# Patient Record
Sex: FEMALE | Race: OTHER | HISPANIC OR LATINO | URBAN - METROPOLITAN AREA
[De-identification: names, ages, dates, MRNs, and addresses within clinical notes are randomized per-mention and may not be internally consistent; named-entity substitution may affect disease eponyms.]

---

## 2024-07-25 ENCOUNTER — INPATIENT (INPATIENT)
Facility: HOSPITAL | Age: 29
LOS: 7 days | Discharge: HOME | DRG: 463 | End: 2024-08-02
Attending: HOSPITALIST | Admitting: INTERNAL MEDICINE
Payer: MEDICAID

## 2024-07-25 VITALS
DIASTOLIC BLOOD PRESSURE: 88 MMHG | TEMPERATURE: 98 F | RESPIRATION RATE: 17 BRPM | HEART RATE: 95 BPM | SYSTOLIC BLOOD PRESSURE: 118 MMHG | WEIGHT: 112.44 LBS | OXYGEN SATURATION: 99 %

## 2024-07-25 DIAGNOSIS — E87.1 HYPO-OSMOLALITY AND HYPONATREMIA: ICD-10-CM

## 2024-07-25 DIAGNOSIS — K59.00 CONSTIPATION, UNSPECIFIED: ICD-10-CM

## 2024-07-25 DIAGNOSIS — Z59.00 HOMELESSNESS UNSPECIFIED: ICD-10-CM

## 2024-07-25 DIAGNOSIS — B37.31 ACUTE CANDIDIASIS OF VULVA AND VAGINA: ICD-10-CM

## 2024-07-25 DIAGNOSIS — Z90.49 ACQUIRED ABSENCE OF OTHER SPECIFIED PARTS OF DIGESTIVE TRACT: Chronic | ICD-10-CM

## 2024-07-25 DIAGNOSIS — N20.0 CALCULUS OF KIDNEY: ICD-10-CM

## 2024-07-25 DIAGNOSIS — D75.839 THROMBOCYTOSIS, UNSPECIFIED: ICD-10-CM

## 2024-07-25 DIAGNOSIS — B78.9 STRONGYLOIDIASIS, UNSPECIFIED: ICD-10-CM

## 2024-07-25 DIAGNOSIS — N13.6 PYONEPHROSIS: ICD-10-CM

## 2024-07-25 DIAGNOSIS — D64.9 ANEMIA, UNSPECIFIED: ICD-10-CM

## 2024-07-25 DIAGNOSIS — Z90.49 ACQUIRED ABSENCE OF OTHER SPECIFIED PARTS OF DIGESTIVE TRACT: ICD-10-CM

## 2024-07-25 DIAGNOSIS — N39.0 URINARY TRACT INFECTION, SITE NOT SPECIFIED: ICD-10-CM

## 2024-07-25 LAB
ALBUMIN SERPL ELPH-MCNC: 4.1 G/DL — SIGNIFICANT CHANGE UP (ref 3.5–5.2)
ALP SERPL-CCNC: 98 U/L — SIGNIFICANT CHANGE UP (ref 30–115)
ALT FLD-CCNC: 10 U/L — SIGNIFICANT CHANGE UP (ref 0–41)
ANION GAP SERPL CALC-SCNC: 12 MMOL/L — SIGNIFICANT CHANGE UP (ref 7–14)
APPEARANCE UR: CLEAR — SIGNIFICANT CHANGE UP
APTT BLD: 34.4 SEC — SIGNIFICANT CHANGE UP (ref 27–39.2)
AST SERPL-CCNC: 18 U/L — SIGNIFICANT CHANGE UP (ref 0–41)
BASOPHILS # BLD AUTO: 0.07 K/UL — SIGNIFICANT CHANGE UP (ref 0–0.2)
BASOPHILS NFR BLD AUTO: 0.5 % — SIGNIFICANT CHANGE UP (ref 0–1)
BILIRUB SERPL-MCNC: 0.4 MG/DL — SIGNIFICANT CHANGE UP (ref 0.2–1.2)
BILIRUB UR-MCNC: NEGATIVE — SIGNIFICANT CHANGE UP
BUN SERPL-MCNC: 14 MG/DL — SIGNIFICANT CHANGE UP (ref 10–20)
CALCIUM SERPL-MCNC: 9.2 MG/DL — SIGNIFICANT CHANGE UP (ref 8.4–10.5)
CHLORIDE SERPL-SCNC: 98 MMOL/L — SIGNIFICANT CHANGE UP (ref 98–110)
CO2 SERPL-SCNC: 23 MMOL/L — SIGNIFICANT CHANGE UP (ref 17–32)
COLOR SPEC: YELLOW — SIGNIFICANT CHANGE UP
CREAT SERPL-MCNC: 0.8 MG/DL — SIGNIFICANT CHANGE UP (ref 0.7–1.5)
DIFF PNL FLD: NEGATIVE — SIGNIFICANT CHANGE UP
EGFR: 103 ML/MIN/1.73M2 — SIGNIFICANT CHANGE UP
EOSINOPHIL # BLD AUTO: 1.61 K/UL — HIGH (ref 0–0.7)
EOSINOPHIL NFR BLD AUTO: 12 % — HIGH (ref 0–8)
GLUCOSE SERPL-MCNC: 143 MG/DL — HIGH (ref 70–99)
GLUCOSE UR QL: NEGATIVE MG/DL — SIGNIFICANT CHANGE UP
HCG SERPL QL: NEGATIVE — SIGNIFICANT CHANGE UP
HCT VFR BLD CALC: 37.2 % — SIGNIFICANT CHANGE UP (ref 37–47)
HGB BLD-MCNC: 11.7 G/DL — LOW (ref 12–16)
IMM GRANULOCYTES NFR BLD AUTO: 0.4 % — HIGH (ref 0.1–0.3)
INR BLD: 1.18 RATIO — SIGNIFICANT CHANGE UP (ref 0.65–1.3)
KETONES UR-MCNC: NEGATIVE MG/DL — SIGNIFICANT CHANGE UP
LEUKOCYTE ESTERASE UR-ACNC: ABNORMAL
LIDOCAIN IGE QN: 49 U/L — SIGNIFICANT CHANGE UP (ref 7–60)
LYMPHOCYTES # BLD AUTO: 0.97 K/UL — LOW (ref 1.2–3.4)
LYMPHOCYTES # BLD AUTO: 7.2 % — LOW (ref 20.5–51.1)
MCHC RBC-ENTMCNC: 25.5 PG — LOW (ref 27–31)
MCHC RBC-ENTMCNC: 31.5 G/DL — LOW (ref 32–37)
MCV RBC AUTO: 81.2 FL — SIGNIFICANT CHANGE UP (ref 81–99)
MONOCYTES # BLD AUTO: 0.84 K/UL — HIGH (ref 0.1–0.6)
MONOCYTES NFR BLD AUTO: 6.3 % — SIGNIFICANT CHANGE UP (ref 1.7–9.3)
NEUTROPHILS # BLD AUTO: 9.88 K/UL — HIGH (ref 1.4–6.5)
NEUTROPHILS NFR BLD AUTO: 73.6 % — SIGNIFICANT CHANGE UP (ref 42.2–75.2)
NITRITE UR-MCNC: NEGATIVE — SIGNIFICANT CHANGE UP
NRBC # BLD: 0 /100 WBCS — SIGNIFICANT CHANGE UP (ref 0–0)
PH UR: 6 — SIGNIFICANT CHANGE UP (ref 5–8)
PLATELET # BLD AUTO: 437 K/UL — HIGH (ref 130–400)
PMV BLD: 9 FL — SIGNIFICANT CHANGE UP (ref 7.4–10.4)
POTASSIUM SERPL-MCNC: 4.3 MMOL/L — SIGNIFICANT CHANGE UP (ref 3.5–5)
POTASSIUM SERPL-SCNC: 4.3 MMOL/L — SIGNIFICANT CHANGE UP (ref 3.5–5)
PROT SERPL-MCNC: 9 G/DL — HIGH (ref 6–8)
PROT UR-MCNC: 30 MG/DL
PROTHROM AB SERPL-ACNC: 13.5 SEC — HIGH (ref 9.95–12.87)
RBC # BLD: 4.58 M/UL — SIGNIFICANT CHANGE UP (ref 4.2–5.4)
RBC # FLD: 15.7 % — HIGH (ref 11.5–14.5)
SODIUM SERPL-SCNC: 133 MMOL/L — LOW (ref 135–146)
SP GR SPEC: 1.02 — SIGNIFICANT CHANGE UP (ref 1–1.03)
UROBILINOGEN FLD QL: 0.2 MG/DL — SIGNIFICANT CHANGE UP (ref 0.2–1)
WBC # BLD: 13.43 K/UL — HIGH (ref 4.8–10.8)
WBC # FLD AUTO: 13.43 K/UL — HIGH (ref 4.8–10.8)

## 2024-07-25 PROCEDURE — 93005 ELECTROCARDIOGRAM TRACING: CPT

## 2024-07-25 PROCEDURE — 36415 COLL VENOUS BLD VENIPUNCTURE: CPT

## 2024-07-25 PROCEDURE — 99222 1ST HOSP IP/OBS MODERATE 55: CPT | Mod: GC

## 2024-07-25 PROCEDURE — 83735 ASSAY OF MAGNESIUM: CPT

## 2024-07-25 PROCEDURE — 80053 COMPREHEN METABOLIC PANEL: CPT

## 2024-07-25 PROCEDURE — 87116 MYCOBACTERIA CULTURE: CPT

## 2024-07-25 PROCEDURE — 93010 ELECTROCARDIOGRAM REPORT: CPT

## 2024-07-25 PROCEDURE — 87591 N.GONORRHOEAE DNA AMP PROB: CPT

## 2024-07-25 PROCEDURE — 87389 HIV-1 AG W/HIV-1&-2 AB AG IA: CPT

## 2024-07-25 PROCEDURE — 85027 COMPLETE CBC AUTOMATED: CPT

## 2024-07-25 PROCEDURE — 99285 EMERGENCY DEPT VISIT HI MDM: CPT

## 2024-07-25 PROCEDURE — 76775 US EXAM ABDO BACK WALL LIM: CPT

## 2024-07-25 PROCEDURE — 87015 SPECIMEN INFECT AGNT CONCNTJ: CPT

## 2024-07-25 PROCEDURE — 87798 DETECT AGENT NOS DNA AMP: CPT

## 2024-07-25 PROCEDURE — 74177 CT ABD & PELVIS W/CONTRAST: CPT | Mod: 26,MC

## 2024-07-25 PROCEDURE — 86901 BLOOD TYPING SEROLOGIC RH(D): CPT

## 2024-07-25 PROCEDURE — 76770 US EXAM ABDO BACK WALL COMP: CPT

## 2024-07-25 PROCEDURE — 87177 OVA AND PARASITES SMEARS: CPT

## 2024-07-25 PROCEDURE — 87206 SMEAR FLUORESCENT/ACID STAI: CPT

## 2024-07-25 PROCEDURE — 85730 THROMBOPLASTIN TIME PARTIAL: CPT

## 2024-07-25 PROCEDURE — 86850 RBC ANTIBODY SCREEN: CPT

## 2024-07-25 PROCEDURE — 87491 CHLMYD TRACH DNA AMP PROBE: CPT

## 2024-07-25 PROCEDURE — 71045 X-RAY EXAM CHEST 1 VIEW: CPT

## 2024-07-25 PROCEDURE — 87661 TRICHOMONAS VAGINALIS AMPLIF: CPT

## 2024-07-25 PROCEDURE — 76830 TRANSVAGINAL US NON-OB: CPT | Mod: 26

## 2024-07-25 PROCEDURE — 86900 BLOOD TYPING SEROLOGIC ABO: CPT

## 2024-07-25 PROCEDURE — 84100 ASSAY OF PHOSPHORUS: CPT

## 2024-07-25 PROCEDURE — 85610 PROTHROMBIN TIME: CPT

## 2024-07-25 PROCEDURE — 86682 HELMINTH ANTIBODY: CPT

## 2024-07-25 PROCEDURE — 85025 COMPLETE CBC W/AUTO DIFF WBC: CPT

## 2024-07-25 PROCEDURE — 87801 DETECT AGNT MULT DNA AMPLI: CPT

## 2024-07-25 PROCEDURE — 86480 TB TEST CELL IMMUN MEASURE: CPT

## 2024-07-25 RX ORDER — ONDANSETRON HCL/PF 4 MG/2 ML
4 VIAL (ML) INJECTION EVERY 8 HOURS
Refills: 0 | Status: DISCONTINUED | OUTPATIENT
Start: 2024-07-25 | End: 2024-08-02

## 2024-07-25 RX ORDER — KETOROLAC TROMETHAMINE 10 MG
15 TABLET ORAL ONCE
Refills: 0 | Status: DISCONTINUED | OUTPATIENT
Start: 2024-07-25 | End: 2024-07-25

## 2024-07-25 RX ORDER — PANTOPRAZOLE SODIUM 20 MG/1
40 TABLET, DELAYED RELEASE ORAL
Refills: 0 | Status: DISCONTINUED | OUTPATIENT
Start: 2024-07-25 | End: 2024-08-02

## 2024-07-25 RX ORDER — DEXTROSE MONOHYDRATE, SODIUM CHLORIDE, SODIUM LACTATE, CALCIUM CHLORIDE, MAGNESIUM CHLORIDE 1.5; 538; 448; 18.4; 5.08 G/100ML; MG/100ML; MG/100ML; MG/100ML; MG/100ML
1000 SOLUTION INTRAPERITONEAL ONCE
Refills: 0 | Status: COMPLETED | OUTPATIENT
Start: 2024-07-25 | End: 2024-07-25

## 2024-07-25 RX ORDER — MAGNESIUM, ALUMINUM HYDROXIDE 200-225/5
30 SUSPENSION, ORAL (FINAL DOSE FORM) ORAL EVERY 4 HOURS
Refills: 0 | Status: DISCONTINUED | OUTPATIENT
Start: 2024-07-25 | End: 2024-08-02

## 2024-07-25 RX ORDER — MELATONIN 3 MG
3 TABLET ORAL AT BEDTIME
Refills: 0 | Status: DISCONTINUED | OUTPATIENT
Start: 2024-07-25 | End: 2024-08-02

## 2024-07-25 RX ORDER — ACETAMINOPHEN 500 MG
650 TABLET ORAL EVERY 6 HOURS
Refills: 0 | Status: DISCONTINUED | OUTPATIENT
Start: 2024-07-25 | End: 2024-08-02

## 2024-07-25 RX ORDER — ONDANSETRON HCL/PF 4 MG/2 ML
4 VIAL (ML) INJECTION ONCE
Refills: 0 | Status: COMPLETED | OUTPATIENT
Start: 2024-07-25 | End: 2024-07-25

## 2024-07-25 RX ORDER — DEXTROSE MONOHYDRATE, SODIUM CHLORIDE, SODIUM LACTATE, CALCIUM CHLORIDE, MAGNESIUM CHLORIDE 1.5; 538; 448; 18.4; 5.08 G/100ML; MG/100ML; MG/100ML; MG/100ML; MG/100ML
500 SOLUTION INTRAPERITONEAL
Refills: 0 | Status: DISCONTINUED | OUTPATIENT
Start: 2024-07-25 | End: 2024-07-29

## 2024-07-25 RX ORDER — KETOROLAC TROMETHAMINE 10 MG
15 TABLET ORAL EVERY 8 HOURS
Refills: 0 | Status: DISCONTINUED | OUTPATIENT
Start: 2024-07-25 | End: 2024-07-26

## 2024-07-25 RX ADMIN — DEXTROSE MONOHYDRATE, SODIUM CHLORIDE, SODIUM LACTATE, CALCIUM CHLORIDE, MAGNESIUM CHLORIDE 1000 MILLILITER(S): 1.5; 538; 448; 18.4; 5.08 SOLUTION INTRAPERITONEAL at 17:30

## 2024-07-25 RX ADMIN — Medication 4 MILLIGRAM(S): at 15:00

## 2024-07-25 RX ADMIN — Medication 15 MILLIGRAM(S): at 23:27

## 2024-07-25 RX ADMIN — Medication 15 MILLIGRAM(S): at 15:00

## 2024-07-25 RX ADMIN — Medication 100 MILLIGRAM(S): at 15:59

## 2024-07-25 SDOH — ECONOMIC STABILITY - HOUSING INSECURITY: HOMELESSNESS UNSPECIFIED: Z59.00

## 2024-07-25 NOTE — H&P ADULT - ATTENDING COMMENTS
HPI:  28 yo F with PMH/PSH of appendectomy non-smoker, denies daily alcohol use, presents with right flank pain for the past 2 days, persistent, radiates to the right side of her back, associated w/ fever , chills and urinary hesitancy, denies measured fever, n/v/d, anorexia, cough, respiratory sx, change in bowel habits or other urinary sx, vaginal d/c or other associated complaints at present.     Patient reports she had a syncopal episode 1 month ago and was taken to a [facility] who had told her she had 2 Right kidney stone. She had other complaints, like nausea, weakness. Patient reports she was told to follow-up with urology; however, did not make an appointment yet. She was not prescribed Abx.     3yo had toxoplasmosis tx.    In ED,  -Vital Signs Last 24 Hrs  T(C): 36.8 (25 Jul 2024 16:02), Max: 36.9 (25 Jul 2024 13:43)  T(F): 98.2 (25 Jul 2024 16:02), Max: 98.4 (25 Jul 2024 13:43)  HR: 93 (25 Jul 2024 16:02) (93 - 95)  BP: 106/67 (25 Jul 2024 18:37) (99/61 - 118/88)  BP(mean): --  RR: 18 (25 Jul 2024 16:02) (17 - 18)  SpO2: 98% (25 Jul 2024 16:02) (98% - 99%)    Parameters below as of 25 Jul 2024 16:02  Patient On (Oxygen Delivery Method): room air    -Labs : wbc 13.43 , hg 11.7 , plt 437 ,na 133, k4.3, crea 0.8   -Ua positive WBC93, RBC0, occ bacteria, moderate LE, neg nitrite   -transvaginal US neg for torsion  - CT A/P shows Right delayed nephrogram, marked perinephric inflammatory stranding with moderate hydronephrosis, minimal distention of renal pelvis, with right UPJ 2.2 x 1.3 x 1.3 cm calculus. Additional right renal pelvis staghorn calculus measuring 3.1 x 1.6 x 1 cm.  Findings compatible with obstructing calculus with right severe pyelonephritis. No evidence of abscess. Some imaging features raise the possibility of a chronic process, however the typical characteristics of xanthogranulomatous pyelonephritis are not appreciated at this time. Additional nonobstructing calculi within the right kidney, measuring up to 0.7 cm.  -Zofran IV push and 2g Rocephin IVPB, 15mg of Toradol IV push, IVF   -Urology consulted REC IR   -Patient admitted for management      (25 Jul 2024 18:48)    REVIEW OF SYSTEMS: see cc/HPI   CONSTITUTIONAL: No weakness, fevers or chills  EYES/ENT: No visual changes;  No vertigo or throat pain   NECK: No pain or stiffness  RESPIRATORY: No cough, wheezing, hemoptysis; No shortness of breath  CARDIOVASCULAR: No chest pain or palpitations  GASTROINTESTINAL: No abdominal or epigastric pain. No nausea, vomiting, or hematemesis; No diarrhea or constipation. No melena or hematochezia.  GENITOURINARY: (+) dysuria, (+) frequency, NO hematuria, see cc/HPI   NEUROLOGICAL: No numbness or weakness  SKIN: No itching, rashes  ENDO: No hyperglycemia, No thyroid disorder, No dyslipidemia   HEM: No bleeding, No easy bruising, No anemia   PSYCHE: No psychosis, No mood disorder No hallucinations No delusion   MSK: No deformity, No fracture, No Joint swelling    Physical Exam:  General: WN/WD NAD  Neurology: A&Ox3, nonfocal, follows commands  Eyes: PERRLA/ EOMI  ENT/Neck: Neck supple, trachea midline, No JVD  Respiratory: CTA B/L, No wheezing, rales, rhonchi  CV: Normal rate regular rhythm, S1S2, no murmurs, rubs or gallops  Abdominal: Soft, NT, ND +BS,   Extremities: No edema, + peripheral pulses  Skin: No Rashes, Hematoma, Ecchymosis    A/p  Acute pyelonephritis   Obstructive uropathy w/ hydronephrosis 2/2 - Staghorn calculus - R renal pelvis and R UPJ calculus   -IV Abx   -IR for R PCNU - Urology recs noted   -Urology follow up     Anemia in young healthy female   -OP eval w/ PCP team    Ambulate for DVT prophylaxis     NOTE INCOMPLETE HPI:  30 yo F with PMH/PSH of appendectomy non-smoker, denies daily alcohol use, presents with right flank pain for the past 2 days, persistent, radiates to the right side of her back, associated w/ fever , chills and urinary hesitancy, denies measured fever, n/v/d, anorexia, cough, respiratory sx, change in bowel habits or other urinary sx, vaginal d/c or other associated complaints at present.     Patient reports she had a syncopal episode 1 month ago and was taken to a [facility] who had told her she had 2 Right kidney stone. She had other complaints, like nausea, weakness. Patient reports she was told to follow-up with urology; however, did not make an appointment yet. She was not prescribed Abx.     3yo had toxoplasmosis tx.    In ED,  -Vital Signs Last 24 Hrs  T(C): 36.8 (25 Jul 2024 16:02), Max: 36.9 (25 Jul 2024 13:43)  T(F): 98.2 (25 Jul 2024 16:02), Max: 98.4 (25 Jul 2024 13:43)  HR: 93 (25 Jul 2024 16:02) (93 - 95)  BP: 106/67 (25 Jul 2024 18:37) (99/61 - 118/88)  BP(mean): --  RR: 18 (25 Jul 2024 16:02) (17 - 18)  SpO2: 98% (25 Jul 2024 16:02) (98% - 99%)    Parameters below as of 25 Jul 2024 16:02  Patient On (Oxygen Delivery Method): room air    -Labs : wbc 13.43 , hg 11.7 , plt 437 ,na 133, k4.3, crea 0.8   -Ua positive WBC93, RBC0, occ bacteria, moderate LE, neg nitrite   -transvaginal US neg for torsion  - CT A/P shows Right delayed nephrogram, marked perinephric inflammatory stranding with moderate hydronephrosis, minimal distention of renal pelvis, with right UPJ 2.2 x 1.3 x 1.3 cm calculus. Additional right renal pelvis staghorn calculus measuring 3.1 x 1.6 x 1 cm.  Findings compatible with obstructing calculus with right severe pyelonephritis. No evidence of abscess. Some imaging features raise the possibility of a chronic process, however the typical characteristics of xanthogranulomatous pyelonephritis are not appreciated at this time. Additional nonobstructing calculi within the right kidney, measuring up to 0.7 cm.  -Zofran IV push and 2g Rocephin IVPB, 15mg of Toradol IV push, IVF   -Urology consulted REC IR   -Patient admitted for management      (25 Jul 2024 18:48)    REVIEW OF SYSTEMS: see cc/HPI   CONSTITUTIONAL: No weakness, fevers or chills  EYES/ENT: No visual changes;  No vertigo or throat pain   NECK: No pain or stiffness  RESPIRATORY: No cough, wheezing, hemoptysis; No shortness of breath  CARDIOVASCULAR: No chest pain or palpitations  GASTROINTESTINAL: (+) R flank /abdominal pain. No nausea, vomiting, or hematemesis; No diarrhea or constipation. No melena or hematochezia.  GENITOURINARY: (+) dysuria, (+) frequency, NO hematuria, see cc/HPI   NEUROLOGICAL: No numbness or weakness  SKIN: No itching, rashes  ENDO: No hyperglycemia, No thyroid disorder, No dyslipidemia   HEM: No bleeding, No easy bruising, No anemia   PSYCHE: No psychosis, No mood disorder No hallucinations No delusion   MSK: No deformity, No fracture, No Joint swelling    Physical Exam: Patient interview in Mongolian and all questions addressed  General: WN/WD NAD  Neurology: A&Ox3, nonfocal, follows commands  Eyes: PERRLA/ EOMI  ENT/Neck: Neck supple, trachea midline, No JVD  Respiratory: CTA B/L, No wheezing, rales, rhonchi  CV: Normal rate regular rhythm, S1S2, no murmurs, rubs or gallops  Abdominal: Soft, ND +BS, R flank tenderness and pain w/ percussion   Extremities: No edema, + peripheral pulses  Skin: No Rashes, Hematoma, Ecchymosis    A/p  Acute pyelonephritis   Obstructive uropathy w/ hydronephrosis 2/2 - Staghorn calculus - R renal pelvis and R UPJ calculus  -NPO after MN   -IV Abx   -IR for R PCNU - Urology recs noted   -Urology follow up     Anemia in young healthy female   -OP eval w/ PCP team    Ambulate for DVT prophylaxis

## 2024-07-25 NOTE — CONSULT NOTE ADULT - SUBJECTIVE AND OBJECTIVE BOX
Dr Gil Art ordered: Total Testosterone 843.5 range 250-1100 performed 6/2/2020. Discussed with patient believes last Testosterone test was done 4/2019 last with Dr Jeane Uribe. Please advise. UROLOGY CONSULT    Patient is a 28 yo F with PMH/PSH of appendectomy presents to ED with Right flank pain for 2 days-  c/s for CT finding of Right renal calculi with right pyelonephritis.     CT A/P shows Right delayed nephrogram, marked perinephric inflammatory stranding with moderate hydronephrosis, minimal distention of renal pelvis, with right UPJ 2.2 x 1.3 x 1.3 cm calculus. Additional right renal pelvis staghorn calculus measuring 3.1 x 1.6 x 1 cm.  Findings compatible with obstructing calculus with right severe pyelonephritis. No evidence of abscess. Some imaging features raise the possibility of a chronic process, however the typical characteristics of xanthogranulomatous pyelonephritis are not appreciated at this time. Additional nonobstructing calculi within the right kidney, measuring up to 0.7 cm.      PAST MEDICAL & SURGICAL HISTORY:      MEDICATIONS  (STANDING):  cefTRIAXone   IVPB 2000 milliGRAM(s) IV Intermittent once  ondansetron Injectable 4 milliGRAM(s) IV Push once    MEDICATIONS  (PRN):      Allergies    No Known Allergies    Intolerances        SOCIAL HISTORY: No illicit drug use    FAMILY HISTORY:      REVIEW OF SYSTEMS:  [ ] A ten-point review of systems was otherwise negative except as noted.   [ ] Due to altered mental status/intubation, subjective information were not able to be obtained from patient. History was obtained, to the extent possible, from review of the chart and collateral sources of information.    Vital Signs Last 24 Hrs  T(C): 36.8 (2024 16:02), Max: 36.9 (2024 13:43)  T(F): 98.2 (2024 16:02), Max: 98.4 (2024 13:43)  HR: 93 (2024 16:02) (93 - 95)  BP: 99/61 (2024 16:02) (99/61 - 118/88)  RR: 18 (2024 16:02) (17 - 18)  SpO2: 98% (2024 16:02) (98% - 99%)    Parameters below as of 2024 16:02  Patient On (Oxygen Delivery Method): room air        PHYSICAL EXAM:    GEN: NAD, well-developed, awake and alert.  SKIN: Good color, non diaphoretic.  HEENT: NC/AT.  RESP: No dyspnea, non-labored breathing. No use of accessory muscles.  CARDIO: +S1/S2  ABDO: Soft, NT/ND, no palpable bladder, no suprapubic tenderness  BACK: No CVAT B/L  : + Patient voids freely       I&O's Summary      LABS:                        11.7   13.43 )-----------( 437      ( 2024 14:57 )             37.2         133<L>  |  98  |  14  ----------------------------<  143<H>  4.3   |  23  |  0.8    Ca    9.2      2024 14:57    TPro  9.0<H>  /  Alb  4.1  /  TBili  0.4  /  DBili  x   /  AST  18  /  ALT  10  /  AlkPhos  98        Urinalysis Basic - ( 2024 14:57 )    Color: Yellow / Appearance: Clear / S.023 / pH: x  Gluc: 143 mg/dL / Ketone: Negative mg/dL  / Bili: Negative / Urobili: 0.2 mg/dL   Blood: x / Protein: 30 mg/dL / Nitrite: Negative   Leuk Esterase: Moderate / RBC: 0 /HPF / WBC 93 /HPF   Sq Epi: x / Non Sq Epi: 1 /HPF / Bacteria: Occasional /HPF            RADIOLOGY & ADDITIONAL STUDIES:  < from: CT Abdomen and Pelvis w/ IV Cont (24 @ 15:26) >  ACC: [id number] EXAM:  CT ABDOMEN AND PELVIS IC   ORDERED BY: Vladislav Rendon     PROCEDURE DATE:  2024          INTERPRETATION:  CLINICAL INFORMATION: Right Flank pain.    COMPARISON: None.    CONTRAST/COMPLICATIONS:  IV Contrast: Omnipaque [id number] cc administered   0 cc discarded  Oral Contrast: NONE  Complications: None reported at time of study completion    PROCEDURE:  CT of the Abdomen and Pelvis was performed.  Sagittal and coronal reformats were performed.    FINDINGS:  LOWER CHEST: Within normal limits.    LIVER: Within normal limits.  BILE DUCTS: Normal caliber.  GALLBLADDER: Within normal limits.  SPLEEN: Within normal limits.  PANCREAS: Within normal limits.  ADRENALS: Within normal limits.  KIDNEYS/URETERS:  Right delayed nephrogram, marked perinephric inflammatory stranding with   moderate hydronephrosis, minimal distention of renal pelvis, with right   UPJ 2.2 x 1.3 x 1.3 cm calculus (average Hounsfield units 1000).   Additional right renal pelvis staghorn calculus measuring 3.1 x 1.6 x 1   cm. Additional nonobstructing calculi within the right kidney, measuring   up to 0.7 cm.    Left kidney unremarkable.    BLADDER: Within normal limits.  REPRODUCTIVE ORGANS: Unremarkable at CT.    BOWEL: No bowel obstruction. Appendix not visualized, possibly resected.  PERITONEUM/RETROPERITONEUM: Likely reactive prominent retroperitoneal   lymph nodes.  VESSELS: Within normal limits.  LYMPH NODES: No lymphadenopathy.  ABDOMINAL WALL: Within normal limits.  BONES: Within normal limits.    IMPRESSION:    Right delayed nephrogram, marked perinephric inflammatory stranding with   moderate hydronephrosis, minimal distention of renal pelvis, with right   UPJ 2.2 x 1.3 x 1.3 cm calculus. Additional right renal pelvis staghorn   calculus measuring 3.1 x 1.6 x 1 cm.  Findings compatible with   obstructing calculus with right severe pyelonephritis. No evidence of   abscess. Some imaging features raise the possibility of a chronic   process, however the typical characteristicsof xanthogranulomatous   pyelonephritis are not appreciated at this time.    Additional nonobstructing calculi within the right kidney, measuring up   to 0.7 cm.      --- End of Report --    NA HUERTA MD; Resident Radiologist  This documenthas been electronically signed.  LISA BARKER MD; Attending Radiologist  This document has been electronically signed. 2024  4:19PM    < end of copied text >   UROLOGY CONSULT    Patient is a 28 yo F with PMH/PSH of appendectomy presents to ED with Right flank pain for 2 days-  c/s for CT finding of Right renal calculi with right pyelonephritis. Patient seen and examined at bedside,  services# [id number]. Patient seen and examined at bedside. Patient reports she had a syncopal episode 1 month ago and was taken to a [facility] who had told her she had 2 Right kidney stone. Patient reports she was told to follow-up with urology; however, did not make an appointment yet. Patient reports patient had intermittent sharp right flank pain, radiating to RLQ for 2 days, with associated nausea and chills. Denies any fever, SOB/difficulty breathing, dysuria, hematuria.    In ED, Zofran IV push and 2g Rocephin IVPB, 15mg of Toradol IV push, IVF patient reports pain is returning now. CT A/P shows Right delayed nephrogram, marked perinephric inflammatory stranding with moderate hydronephrosis, minimal distention of renal pelvis, with right UPJ 2.2 x 1.3 x 1.3 cm calculus. Additional right renal pelvis staghorn calculus measuring 3.1 x 1.6 x 1 cm.  Findings compatible with obstructing calculus with right severe pyelonephritis. No evidence of abscess. Some imaging features raise the possibility of a chronic process, however the typical characteristics of xanthogranulomatous pyelonephritis are not appreciated at this time. Additional nonobstructing calculi within the right kidney, measuring up to 0.7 cm.      PAST MEDICAL & SURGICAL HISTORY:      MEDICATIONS  (STANDING):  cefTRIAXone   IVPB 2000 milliGRAM(s) IV Intermittent once  ondansetron Injectable 4 milliGRAM(s) IV Push once    MEDICATIONS  (PRN):      Allergies    No Known Allergies    Intolerances        SOCIAL HISTORY: No illicit drug use    FAMILY HISTORY:      REVIEW OF SYSTEMS:  [ ] A ten-point review of systems was otherwise negative except as noted.   [ ] Due to altered mental status/intubation, subjective information were not able to be obtained from patient. History was obtained, to the extent possible, from review of the chart and collateral sources of information.    Vital Signs Last 24 Hrs  T(C): 36.8 (2024 16:02), Max: 36.9 (2024 13:43)  T(F): 98.2 (2024 16:02), Max: 98.4 (2024 13:43)  HR: 93 (2024 16:02) (93 - 95)  BP: 99/61 (2024 16:02) (99/61 - 118/88)  RR: 18 (2024 16:02) (17 - 18)  SpO2: 98% (2024 16:02) (98% - 99%)    Parameters below as of 2024 16:02  Patient On (Oxygen Delivery Method): room air        PHYSICAL EXAM:    GEN: NAD, well-developed, awake and alert.  SKIN: Good color, non diaphoretic.  HEENT: NC/AT.  RESP: No dyspnea, non-labored breathing. No use of accessory muscles.  CARDIO: +S1/S2  ABDO: Soft, NT/ND, no palpable bladder, no suprapubic tenderness  BACK: No CVAT B/L  : + Patient voids freely       I&O's Summary      LABS:                        11.7   13.43 )-----------( 437      ( 2024 14:57 )             37.2     07-25    133<L>  |  98  |  14  ----------------------------<  143<H>  4.3   |  23  |  0.8    Ca    9.2      2024 14:57    TPro  9.0<H>  /  Alb  4.1  /  TBili  0.4  /  DBili  x   /  AST  18  /  ALT  10  /  AlkPhos  98  07-25      Urinalysis Basic - ( 2024 14:57 )    Color: Yellow / Appearance: Clear / S.023 / pH: x  Gluc: 143 mg/dL / Ketone: Negative mg/dL  / Bili: Negative / Urobili: 0.2 mg/dL   Blood: x / Protein: 30 mg/dL / Nitrite: Negative   Leuk Esterase: Moderate / RBC: 0 /HPF / WBC 93 /HPF   Sq Epi: x / Non Sq Epi: 1 /HPF / Bacteria: Occasional /HPF            RADIOLOGY & ADDITIONAL STUDIES:  < from: CT Abdomen and Pelvis w/ IV Cont (24 @ 15:26) >  ACC: [id number] EXAM:  CT ABDOMEN AND PELVIS IC   ORDERED BY: Vladislav Rendon     PROCEDURE DATE:  2024          INTERPRETATION:  CLINICAL INFORMATION: Right Flank pain.    COMPARISON: None.    CONTRAST/COMPLICATIONS:  IV Contrast: Omnipaque [id number] cc administered   0 cc discarded  Oral Contrast: NONE  Complications: None reported at time of study completion    PROCEDURE:  CT of the Abdomen and Pelvis was performed.  Sagittal and coronal reformats were performed.    FINDINGS:  LOWER CHEST: Within normal limits.    LIVER: Within normal limits.  BILE DUCTS: Normal caliber.  GALLBLADDER: Within normal limits.  SPLEEN: Within normal limits.  PANCREAS: Within normal limits.  ADRENALS: Within normal limits.  KIDNEYS/URETERS:  Right delayed nephrogram, marked perinephric inflammatory stranding with   moderate hydronephrosis, minimal distention of renal pelvis, with right   UPJ 2.2 x 1.3 x 1.3 cm calculus (average Hounsfield units 1000).   Additional right renal pelvis staghorn calculus measuring 3.1 x 1.6 x 1   cm. Additional nonobstructing calculi within the right kidney, measuring   up to 0.7 cm.    Left kidney unremarkable.    BLADDER: Within normal limits.  REPRODUCTIVE ORGANS: Unremarkable at CT.    BOWEL: No bowel obstruction. Appendix not visualized, possibly resected.  PERITONEUM/RETROPERITONEUM: Likely reactive prominent retroperitoneal   lymph nodes.  VESSELS: Within normal limits.  LYMPH NODES: No lymphadenopathy.  ABDOMINAL WALL: Within normal limits.  BONES: Within normal limits.    IMPRESSION:    Right delayed nephrogram, marked perinephric inflammatory stranding with   moderate hydronephrosis, minimal distention of renal pelvis, with right   UPJ 2.2 x 1.3 x 1.3 cm calculus. Additional right renal pelvis staghorn   calculus measuring 3.1 x 1.6 x 1 cm.  Findings compatible with   obstructing calculus with right severe pyelonephritis. No evidence of   abscess. Some imaging features raise the possibility of a chronic   process, however the typical characteristicsof xanthogranulomatous   pyelonephritis are not appreciated at this time.    Additional nonobstructing calculi within the right kidney, measuring up   to 0.7 cm.      --- End of Report --    NA HUERTA MD; Resident Radiologist  This documenthas been electronically signed.  LISA BARKER MD; Attending Radiologist  This document has been electronically signed. 2024  4:19PM    < end of copied text >

## 2024-07-25 NOTE — ED ADULT TRIAGE NOTE - TEMPERATURE IN FAHRENHEIT (DEGREES F)
98.4 How Severe Are Your Spot(S)?: moderate What Is The Reason For Today's Visit?: Full Body Skin Examination What Is The Reason For Today's Visit? (Being Monitored For X): the risk of recurrence of previously treated lesion(s)

## 2024-07-25 NOTE — H&P ADULT - NSHPLABSRESULTS_GEN_ALL_CORE
LABS:                        11.7   13.43 )-----------( 437      ( 25 Jul 2024 14:57 )             37.2     07-25    133<L>  |  98  |  14  ----------------------------<  143<H>  4.3   |  23  |  0.8    Ca    9.2      25 Jul 2024 14:57    TPro  9.0<H>  /  Alb  4.1  /  TBili  0.4  /  DBili  x   /  AST  18  /  ALT  10  /  AlkPhos  98  07-25        Urinalysis with Rflx Culture (collected 25 Jul 2024 14:57)     US Transvaginal (07.25.24 @ 16:48) >    FINDINGS:  Uterus: 5.1 x 2.2 x 3.1 cm. Within normal limits. Nabothian cysts.  Endometrium: 5 mm. Within normal limits.    Right ovary: 2.9 x 2.1 x 2.4 cm. Volume is 8 cc. Within normal limits.   Doppler flow is demonstrated.  Left ovary: 3.2 x 2.0 x 1.8. Volume is 6 cc. Within normal limits.   Doppler flow is demonstrated.    Fluid: None.    IMPRESSION:  Normal pelvic sonogram.    < end of copied text >      < from: CT Abdomen and Pelvis w/ IV Cont (07.25.24 @ 15:26) >    IMPRESSION:    Right delayed nephrogram, marked perinephric inflammatory stranding with   moderate hydronephrosis, minimal distention of renal pelvis, with right   UPJ 2.2 x 1.3 x 1.3 cm calculus. Additional right renal pelvis staghorn   calculus measuring 3.1 x 1.6 x 1 cm.  Findings compatible with   obstructing calculus with right severe pyelonephritis. No evidence of   abscess. Some imaging features raise the possibility of a chronic   process, however the typical characteristicsof xanthogranulomatous   pyelonephritis are not appreciated at this time.    Additional nonobstructing calculi within the right kidney, measuring up   to 0.7 cm.      < end of copied text >

## 2024-07-25 NOTE — ED PROVIDER NOTE - OBJECTIVE STATEMENT
29-year-old female, Turkish-speaking history corroborated using language line Nelson [id number] presents with right lower abdominal pain radiating to right lower back and flank X 2 days.  Patient reports associated subjective fevers and nausea, no vomiting.  Patient states in the past she was worked up for similar symptoms and told she has kidney stones.  Patient reports urinary hesitancy X 1 week.  Denies chest pain, shortness of breath, diarrhea.

## 2024-07-25 NOTE — ED PROVIDER NOTE - CLINICAL SUMMARY MEDICAL DECISION MAKING FREE TEXT BOX
VSS, febrile, CT with obstructing renal stone and pyelo, pt appears comfortable, pain controlled, unlikely to pass with expectant management,  and IR consulted. Plan to admit for further evaluation and management.    Abdominal exam without peritoneal signs. No evidence of acute abdomen currently. Well appearing. Given work up, low suspicion for acute hepatobiliary disease (including acute cholecystitis or cholangitis), acute pancreatitis, PUD (including gastric perforation), acute infectious processes (including pneumonia, hepatitis, pyelonephritis), acute appendicitis, vascular catastrophe, bowel obstruction, viscus perforation, torsion, diverticulitis, or acute coronary syndrome. Presentation not consistent with other acute, emergent causes of abdominal pain at this time.

## 2024-07-25 NOTE — ED ADULT NURSE NOTE - NSFALLUNIVINTERV_ED_ALL_ED
Bed/Stretcher in lowest position, wheels locked, appropriate side rails in place/Call bell, personal items and telephone in reach/Instruct patient to call for assistance before getting out of bed/chair/stretcher/Non-slip footwear applied when patient is off stretcher/[place] to call system/Physically safe environment - no spills, clutter or unnecessary equipment/Purposeful proactive rounding/Room/bathroom lighting operational, light cord in reach

## 2024-07-25 NOTE — ED PROVIDER NOTE - CARE PLAN
1 Principal Discharge DX:	Pyelonephritis  Secondary Diagnosis:	Staghorn calculus  Secondary Diagnosis:	UTI (urinary tract infection)

## 2024-07-25 NOTE — H&P ADULT - ASSESSMENT
30 yo F with PMH/PSH of appendectomy non-smoker, denies daily alcohol use, presents with right flank pain for the past 2 days.     #right flank pain  #pyelonephritis  #kidney stones  -Labs : wbc 13.43 , hg 11.7 , plt 437 ,na 133, k4.3, crea 0.8   -Ua positive WBC93, RBC0, occ bacteria, moderate LE, neg nitrite   - CT A/P shows Right delayed nephrogram, marked perinephric inflammatory stranding with moderate hydronephrosis, minimal distention of renal pelvis, with right UPJ 2.2 x 1.3 x 1.3 cm calculus. Additional right renal pelvis staghorn calculus measuring 3.1 x 1.6 x 1 cm.  Findings compatible with obstructing calculus with right severe pyelonephritis. No evidence of abscess. Some imaging features raise the possibility of a chronic process, however the typical characteristics of xanthogranulomatous pyelonephritis are not appreciated at this time. Additional nonobstructing calculi within the right kidney, measuring up to 0.7 cm.  -Zofran IV push and 2g Rocephin IVPB, 15mg of Toradol IV push, IVF   - seen by uro in ED  - Due to location and size of her to stone, recommend right PCN/PCNU placement by IR at this time   - IR c/s for Right PCN/PCNU placement   - Continue IV abx - Rocephin   - ID c/s for abx recommendation   - Continue IVF  - Analgesics for pain control prn - i.e Toradol   - f/u UCx   - Patient will need to f/u outpatient with urology for definitive stone management once infection is resolved.     #anemia  -OP f/u     #MISC  -DVT ppx:   -GI ppx:   -DIET:   -Activity: AAT  -Code Status: Full   -Pending:   -DIspo:      30 yo F with PMH/PSH of appendectomy non-smoker, denies daily alcohol use, presents with right flank pain for the past 2 days.     #right flank pain  #pyelonephritis  #kidney stones  -Labs : wbc 13.43 , hg 11.7 , plt 437 ,na 133, k4.3, crea 0.8   -Ua positive WBC93, RBC0, occ bacteria, moderate LE, neg nitrite   - CT A/P shows Right delayed nephrogram, marked perinephric inflammatory stranding with moderate hydronephrosis, minimal distention of renal pelvis, with right UPJ 2.2 x 1.3 x 1.3 cm calculus. Additional right renal pelvis staghorn calculus measuring 3.1 x 1.6 x 1 cm.  Findings compatible with obstructing calculus with right severe pyelonephritis. No evidence of abscess. Some imaging features raise the possibility of a chronic process, however the typical characteristics of xanthogranulomatous pyelonephritis are not appreciated at this time. Additional nonobstructing calculi within the right kidney, measuring up to 0.7 cm.  -Zofran IV push and 2g Rocephin IVPB, 15mg of Toradol IV push, IVF   - seen by uro in ED  - Due to location and size of her to stone, recommend right PCN/PCNU placement by IR at this time   - IR c/s for Right PCN/PCNU placement , npo after midnight, type and screen  and inr 8pm and in am   - Continue IV abx - Rocephin   - ID cs  - Continue IVF, LR 100ml per hour for 12 hours  - Analgesics for pain control prn toradol and tylenol  - f/u UCx   - Patient will need to f/u outpatient with urology for definitive stone management once infection is resolved.   -if fever and chills take blood cultures     #anemia  -mcv 81   -OP f/u     #MISC  -DVT ppx: off for procedure tomorrow, restart afterwards  -GI ppx: ppi in setting of nsaid use   -DIET: regular , npo after midnight   -Activity: AAT  -Code Status: Full      30 yo F with PMH/PSH of appendectomy non-smoker, denies daily alcohol use, presents with right flank pain for the past 2 days.     #right flank pain  #pyelonephritis  #kidney stones  #leukocytosis  -Labs : wbc 13.43 , hg 11.7 , plt 437 ,na 133, k4.3, crea 0.8   -Ua positive WBC93, RBC0, occ bacteria, moderate LE, neg nitrite   - CT A/P shows Right delayed nephrogram, marked perinephric inflammatory stranding with moderate hydronephrosis, minimal distention of renal pelvis, with right UPJ 2.2 x 1.3 x 1.3 cm calculus. Additional right renal pelvis staghorn calculus measuring 3.1 x 1.6 x 1 cm.  Findings compatible with obstructing calculus with right severe pyelonephritis. No evidence of abscess. Some imaging features raise the possibility of a chronic process, however the typical characteristics of xanthogranulomatous pyelonephritis are not appreciated at this time. Additional nonobstructing calculi within the right kidney, measuring up to 0.7 cm.  -Zofran IV push and 2g Rocephin IVPB, 15mg of Toradol IV push, IVF   - seen by uro in ED  - Due to location and size of her to stone, recommend right PCN/PCNU placement by IR at this time   - IR c/s for Right PCN/PCNU placement , npo after midnight, type and screen  and inr 8pm and in am   - Continue IV abx - Rocephin   - ID cs  - Continue IVF, LR 100ml per hour for 12 hours  - Analgesics for pain control prn toradol and tylenol  - f/u UCx   - Patient will need to f/u outpatient with urology for definitive stone management once infection is resolved.   -if fever and chills take blood cultures     #anemia  -mcv 81   -OP f/u     #MISC  -DVT ppx: off for procedure tomorrow, restart afterwards  -GI ppx: ppi in setting of nsaid use   -DIET: regular , npo after midnight   -Activity: AAT  -Code Status: Full

## 2024-07-25 NOTE — ED PROVIDER NOTE - PHYSICAL EXAMINATION
Vital Signs: I have reviewed the initial vital signs.  CONSTITUTIONAL: Pt in no acute distress laying on stretcher, nontoxic.  SKIN: Skin exam is warm and dry, no acute rash.  HEAD: Normocephalic; atraumatic.  EYES: PERRL, EOM intact; conjunctiva and sclera clear.  ENT: No nasal discharge; airway clear.   NECK: Supple; FROM  CARD: S1, S2 normal; no murmurs, gallops, or rubs. Regular rate and rhythm.  RESP: CTAB. No wheezes, rales or rhonchi.  ABD: +right sided tenderness, soft; non-distended; no hepatosplenomegaly.  MSK: +right CVA tenderness. Normal ROM. No clubbing, cyanosis or edema.

## 2024-07-25 NOTE — H&P ADULT - HISTORY OF PRESENT ILLNESS
30 yo F with PMH/PSH of appendectomy non-smoker, denies daily alcohol use, presents with right flank pain for the past 2 days, persistent, radiates to the right side of her back, associated tactile temp, chills and urinary hesitancy, denies measured fever, n/v/d, anorexia, cough, respiratory sx, change in bowel habits or other urinary sx, vaginal d/c or other associated complaints at present.     Patient reports she had a syncopal episode 1 month ago and was taken to a [facility] who had told her she had 2 Right kidney stone. Patient reports she was told to follow-up with urology; however, did not make an appointment yet.     In ED,  -Vital Signs Last 24 Hrs  T(C): 36.8 (25 Jul 2024 16:02), Max: 36.9 (25 Jul 2024 13:43)  T(F): 98.2 (25 Jul 2024 16:02), Max: 98.4 (25 Jul 2024 13:43)  HR: 93 (25 Jul 2024 16:02) (93 - 95)  BP: 106/67 (25 Jul 2024 18:37) (99/61 - 118/88)  BP(mean): --  RR: 18 (25 Jul 2024 16:02) (17 - 18)  SpO2: 98% (25 Jul 2024 16:02) (98% - 99%)    Parameters below as of 25 Jul 2024 16:02  Patient On (Oxygen Delivery Method): room air    -Labs : wbc 13.43 , hg 11.7 , plt 437 ,na 133, k4.3, crea 0.8   -Ua positive WBC93, RBC0, occ bacteria, moderate LE, neg nitrite   -transvaginal US neg for torsion  - CT A/P shows Right delayed nephrogram, marked perinephric inflammatory stranding with moderate hydronephrosis, minimal distention of renal pelvis, with right UPJ 2.2 x 1.3 x 1.3 cm calculus. Additional right renal pelvis staghorn calculus measuring 3.1 x 1.6 x 1 cm.  Findings compatible with obstructing calculus with right severe pyelonephritis. No evidence of abscess. Some imaging features raise the possibility of a chronic process, however the typical characteristics of xanthogranulomatous pyelonephritis are not appreciated at this time. Additional nonobstructing calculi within the right kidney, measuring up to 0.7 cm.  -Zofran IV push and 2g Rocephin IVPB, 15mg of Toradol IV push, IVF   -Urology consulted      28 yo F with PMH/PSH of appendectomy non-smoker, denies daily alcohol use, presents with right flank pain for the past 2 days, persistent, radiates to the right side of her back, associated tactile temp, chills and urinary hesitancy, denies measured fever, n/v/d, anorexia, cough, respiratory sx, change in bowel habits or other urinary sx, vaginal d/c or other associated complaints at present.     Patient reports she had a syncopal episode 1 month ago and was taken to a [facility] who had told her she had 2 Right kidney stone. Patient reports she was told to follow-up with urology; however, did not make an appointment yet.     In ED,  -Vital Signs Last 24 Hrs  T(C): 36.8 (25 Jul 2024 16:02), Max: 36.9 (25 Jul 2024 13:43)  T(F): 98.2 (25 Jul 2024 16:02), Max: 98.4 (25 Jul 2024 13:43)  HR: 93 (25 Jul 2024 16:02) (93 - 95)  BP: 106/67 (25 Jul 2024 18:37) (99/61 - 118/88)  BP(mean): --  RR: 18 (25 Jul 2024 16:02) (17 - 18)  SpO2: 98% (25 Jul 2024 16:02) (98% - 99%)    Parameters below as of 25 Jul 2024 16:02  Patient On (Oxygen Delivery Method): room air    -Labs : wbc 13.43 , hg 11.7 , plt 437 ,na 133, k4.3, crea 0.8   -Ua positive WBC93, RBC0, occ bacteria, moderate LE, neg nitrite   -transvaginal US neg for torsion  - CT A/P shows Right delayed nephrogram, marked perinephric inflammatory stranding with moderate hydronephrosis, minimal distention of renal pelvis, with right UPJ 2.2 x 1.3 x 1.3 cm calculus. Additional right renal pelvis staghorn calculus measuring 3.1 x 1.6 x 1 cm.  Findings compatible with obstructing calculus with right severe pyelonephritis. No evidence of abscess. Some imaging features raise the possibility of a chronic process, however the typical characteristics of xanthogranulomatous pyelonephritis are not appreciated at this time. Additional nonobstructing calculi within the right kidney, measuring up to 0.7 cm.  -Zofran IV push and 2g Rocephin IVPB, 15mg of Toradol IV push, IVF   -Urology consulted REC IR   -Patient admitted for management      28 yo F with PMH/PSH of appendectomy non-smoker, denies daily alcohol use, presents with right flank pain for the past 2 days, persistent, radiates to the right side of her back, associated w/ fever , chills and urinary hesitancy, denies measured fever, n/v/d, anorexia, cough, respiratory sx, change in bowel habits or other urinary sx, vaginal d/c or other associated complaints at present.     Patient reports she had a syncopal episode 1 month ago and was taken to a [facility] who had told her she had 2 Right kidney stone.She had other complaints, like nausea, weakness. Patient reports she was told to follow-up with urology; however, did not make an appointment yet. She was not prescribed abx.     3yo had toxoplasmosis tx.    In ED,  -Vital Signs Last 24 Hrs  T(C): 36.8 (25 Jul 2024 16:02), Max: 36.9 (25 Jul 2024 13:43)  T(F): 98.2 (25 Jul 2024 16:02), Max: 98.4 (25 Jul 2024 13:43)  HR: 93 (25 Jul 2024 16:02) (93 - 95)  BP: 106/67 (25 Jul 2024 18:37) (99/61 - 118/88)  BP(mean): --  RR: 18 (25 Jul 2024 16:02) (17 - 18)  SpO2: 98% (25 Jul 2024 16:02) (98% - 99%)    Parameters below as of 25 Jul 2024 16:02  Patient On (Oxygen Delivery Method): room air    -Labs : wbc 13.43 , hg 11.7 , plt 437 ,na 133, k4.3, crea 0.8   -Ua positive WBC93, RBC0, occ bacteria, moderate LE, neg nitrite   -transvaginal US neg for torsion  - CT A/P shows Right delayed nephrogram, marked perinephric inflammatory stranding with moderate hydronephrosis, minimal distention of renal pelvis, with right UPJ 2.2 x 1.3 x 1.3 cm calculus. Additional right renal pelvis staghorn calculus measuring 3.1 x 1.6 x 1 cm.  Findings compatible with obstructing calculus with right severe pyelonephritis. No evidence of abscess. Some imaging features raise the possibility of a chronic process, however the typical characteristics of xanthogranulomatous pyelonephritis are not appreciated at this time. Additional nonobstructing calculi within the right kidney, measuring up to 0.7 cm.  -Zofran IV push and 2g Rocephin IVPB, 15mg of Toradol IV push, IVF   -Urology consulted REC IR   -Patient admitted for management

## 2024-07-25 NOTE — ED PROVIDER NOTE - ATTENDING APP SHARED VISIT CONTRIBUTION OF CARE
I have reviewed and agree with the mid-level note, except as documented in my note below.    29-year-old female denies significant PMH, PSH significant for appendectomy,, non-smoker, denies daily alcohol use, now presents with right flank pain for the past 2 days, persistent, radiates to the right side of her back, associated tactile temp, chills and urinary hesitancy, denies measured fever, n/v/d, anorexia, cough, respiratory sx, change in bowel habits or other urinary sx, vaginal d/c or other associated complaints at present. No old chart available for review. I have reviewed and agree with the initial nursing note, except as documented in my note.    VSS, awake, alert, non-toxic appearing, no scleral icterus, oropharynx clear, mmm, no jaundice, skin rash or lesions, chest CTAB, non-labored breathing, no w/r/r, +S1/S2, RRR, no m/r/g, abdomen soft, RLQ>RUQ ttp w/o peritoneal signs, +BS, no hernias or distention, no pulsatile masses or bruits appreciated, no CVA tenderness, no peripheral edema or deformities, alert, clear speech and steady gait.

## 2024-07-25 NOTE — H&P ADULT - NSHPPHYSICALEXAM_GEN_ALL_CORE
CONSTITUTIONAL: Pt in no acute distress , nontoxic.  SKIN: Skin exam is warm and dry, no acute rash.  HEAD: Normocephalic; atraumatic.  EYES: PERRL, EOM intact; conjunctiva and sclera clear.  ENT: No nasal discharge; airway clear.   NECK: Supple; FROM  CARD: S1, S2 normal; no murmurs, gallops, or rubs. Regular rate and rhythm.  RESP: CTAB. No wheezes, rales or rhonchi.  ABD: +right sided tenderness, soft; non-distended; no hepatosplenomegaly.  MSK: +right CVA tenderness. Normal ROM. No clubbing, cyanosis or edema.

## 2024-07-25 NOTE — CONSULT NOTE ADULT - ASSESSMENT
Patient is a 28 yo F with PMH/PSH of appendectomy presents to ED with Right flank pain for 2 days-  c/s for CT finding of Right renal calculi with right pyelonephritis. CT A/P shows Right delayed nephrogram, marked perinephric inflammatory stranding with moderate hydronephrosis, minimal distention of renal pelvis, with right UPJ 2.2 x 1.3 x 1.3 cm calculus. Additional right renal pelvis staghorn calculus measuring 3.1 x 1.6 x 1 cm.  Findings compatible with obstructing calculus with right severe pyelonephritis. No evidence of abscess. Some imaging features raise the possibility of a chronic process, however the typical characteristics of xanthogranulomatous pyelonephritis are not appreciated at this time. Additional nonobstructing calculi within the right kidney, measuring up to 0.7 cm. Patient is a 30 yo F with PMH/PSH of appendectomy presents to ED with Right flank pain for 2 days-  c/s for CT finding of Right renal calculi with right pyelonephritis. CT A/P shows Right delayed nephrogram, marked perinephric inflammatory stranding with moderate hydronephrosis, minimal distention of renal pelvis, with right UPJ 2.2 x 1.3 x 1.3 cm calculus. Additional right renal pelvis staghorn calculus measuring 3.1 x 1.6 x 1 cm.  Findings compatible with obstructing calculus with right severe pyelonephritis. No evidence of abscess. Some imaging features raise the possibility of a chronic process, however the typical characteristics of xanthogranulomatous pyelonephritis are not appreciated at this time. Additional nonobstructing calculi within the right kidney, measuring up to 0.7 cm.    Plan:   Case discussed and CT reviewed with Dr. Carter, plan as follows:   - Due to location and size of her to stone, recommend right PCN placement by IR at this time   - IR c/s for Right PCN placement   - Continue IV abx - Rocephin   - ID c/s for abx recommendation   - Continue IVF  - Analgesics for pain control prn - i.e Toradol   - f/u UCx   - Patient will need to f/u outpatient with urology for definitive stone management once infection is resolved.   - Will follow   - spoke with ED team Patient is a 28 yo F with PMH/PSH of appendectomy presents to ED with Right flank pain for 2 days-  c/s for CT finding of Right renal calculi with right pyelonephritis. CT A/P shows Right delayed nephrogram, marked perinephric inflammatory stranding with moderate hydronephrosis, minimal distention of renal pelvis, with right UPJ 2.2 x 1.3 x 1.3 cm calculus. Additional right renal pelvis staghorn calculus measuring 3.1 x 1.6 x 1 cm.  Findings compatible with obstructing calculus with right severe pyelonephritis. No evidence of abscess. Some imaging features raise the possibility of a chronic process, however the typical characteristics of xanthogranulomatous pyelonephritis are not appreciated at this time. Additional nonobstructing calculi within the right kidney, measuring up to 0.7 cm.    Plan:   Case discussed and CT reviewed with Dr. Carter, plan as follows:   - Due to location and size of her to stone, recommend right PCN/PCNU placement by IR at this time   - IR c/s for Right PCN/PCNU placement   - Continue IV abx - Rocephin   - ID c/s for abx recommendation   - Continue IVF  - Analgesics for pain control prn - i.e Toradol   - f/u UCx   - Patient will need to f/u outpatient with urology for definitive stone management once infection is resolved.   - Will follow   - spoke with ED team

## 2024-07-25 NOTE — ED PROVIDER NOTE - PROGRESS NOTE DETAILS
CA: urology consulted after CT callback showed 2cm calculus in setting of UTI/pyelonephritis. Urology aware of pt.

## 2024-07-26 LAB
ALBUMIN SERPL ELPH-MCNC: 3.8 G/DL — SIGNIFICANT CHANGE UP (ref 3.5–5.2)
ALP SERPL-CCNC: 78 U/L — SIGNIFICANT CHANGE UP (ref 30–115)
ALT FLD-CCNC: 9 U/L — SIGNIFICANT CHANGE UP (ref 0–41)
ANION GAP SERPL CALC-SCNC: 11 MMOL/L — SIGNIFICANT CHANGE UP (ref 7–14)
APTT BLD: 35.4 SEC — SIGNIFICANT CHANGE UP (ref 27–39.2)
AST SERPL-CCNC: 14 U/L — SIGNIFICANT CHANGE UP (ref 0–41)
BILIRUB SERPL-MCNC: 0.3 MG/DL — SIGNIFICANT CHANGE UP (ref 0.2–1.2)
BUN SERPL-MCNC: 14 MG/DL — SIGNIFICANT CHANGE UP (ref 10–20)
CALCIUM SERPL-MCNC: 8.7 MG/DL — SIGNIFICANT CHANGE UP (ref 8.4–10.5)
CHLORIDE SERPL-SCNC: 100 MMOL/L — SIGNIFICANT CHANGE UP (ref 98–110)
CO2 SERPL-SCNC: 24 MMOL/L — SIGNIFICANT CHANGE UP (ref 17–32)
CREAT SERPL-MCNC: 1 MG/DL — SIGNIFICANT CHANGE UP (ref 0.7–1.5)
EGFR: 79 ML/MIN/1.73M2 — SIGNIFICANT CHANGE UP
GLUCOSE SERPL-MCNC: 73 MG/DL — SIGNIFICANT CHANGE UP (ref 70–99)
HCT VFR BLD CALC: 34 % — LOW (ref 37–47)
HGB BLD-MCNC: 10.6 G/DL — LOW (ref 12–16)
INR BLD: 1.18 RATIO — SIGNIFICANT CHANGE UP (ref 0.65–1.3)
MCHC RBC-ENTMCNC: 25.5 PG — LOW (ref 27–31)
MCHC RBC-ENTMCNC: 31.2 G/DL — LOW (ref 32–37)
MCV RBC AUTO: 81.7 FL — SIGNIFICANT CHANGE UP (ref 81–99)
NRBC # BLD: 0 /100 WBCS — SIGNIFICANT CHANGE UP (ref 0–0)
PLATELET # BLD AUTO: 387 K/UL — SIGNIFICANT CHANGE UP (ref 130–400)
PMV BLD: 8.8 FL — SIGNIFICANT CHANGE UP (ref 7.4–10.4)
POTASSIUM SERPL-MCNC: 4.6 MMOL/L — SIGNIFICANT CHANGE UP (ref 3.5–5)
POTASSIUM SERPL-SCNC: 4.6 MMOL/L — SIGNIFICANT CHANGE UP (ref 3.5–5)
PROT SERPL-MCNC: 7.9 G/DL — SIGNIFICANT CHANGE UP (ref 6–8)
PROTHROM AB SERPL-ACNC: 13.5 SEC — HIGH (ref 9.95–12.87)
RBC # BLD: 4.16 M/UL — LOW (ref 4.2–5.4)
RBC # FLD: 15.9 % — HIGH (ref 11.5–14.5)
SODIUM SERPL-SCNC: 135 MMOL/L — SIGNIFICANT CHANGE UP (ref 135–146)
WBC # BLD: 11.77 K/UL — HIGH (ref 4.8–10.8)
WBC # FLD AUTO: 11.77 K/UL — HIGH (ref 4.8–10.8)

## 2024-07-26 PROCEDURE — 99233 SBSQ HOSP IP/OBS HIGH 50: CPT

## 2024-07-26 RX ADMIN — Medication 100 MILLIGRAM(S): at 06:44

## 2024-07-26 RX ADMIN — Medication 15 MILLIGRAM(S): at 22:16

## 2024-07-26 RX ADMIN — PANTOPRAZOLE SODIUM 40 MILLIGRAM(S): 20 TABLET, DELAYED RELEASE ORAL at 06:47

## 2024-07-26 RX ADMIN — Medication 650 MILLIGRAM(S): at 22:04

## 2024-07-26 RX ADMIN — Medication 15 MILLIGRAM(S): at 23:16

## 2024-07-26 RX ADMIN — Medication 4 MILLIGRAM(S): at 18:15

## 2024-07-26 RX ADMIN — Medication 30 MILLILITER(S): at 22:04

## 2024-07-26 RX ADMIN — Medication 650 MILLIGRAM(S): at 23:07

## 2024-07-26 RX ADMIN — Medication 15 MILLIGRAM(S): at 06:43

## 2024-07-26 RX ADMIN — DEXTROSE MONOHYDRATE, SODIUM CHLORIDE, SODIUM LACTATE, CALCIUM CHLORIDE, MAGNESIUM CHLORIDE 100 MILLILITER(S): 1.5; 538; 448; 18.4; 5.08 SOLUTION INTRAPERITONEAL at 04:30

## 2024-07-26 NOTE — PROGRESS NOTE ADULT - SUBJECTIVE AND OBJECTIVE BOX
UROLOGY DAILY PROGRESS NOTE    Patient is a 28 yo F presents to ED with Right flank pain for 2 days-  c/s for CT finding of Right renal calculi with right pyelonephritis.   CT A/P shows Right delayed nephrogram, marked perinephric inflammatory stranding with moderate hydronephrosis, minimal distention of renal pelvis, with right UPJ 2.2 x 1.3 x 1.3 cm calculus. Additional right renal pelvis staghorn calculus measuring 3.1 x 1.6 x 1 cm.  Findings compatible with obstructing calculus with right severe pyelonephritis. No evidence of abscess. Some imaging features raise the possibility of a chronic process, however the typical characteristics of xanthogranulomatous pyelonephritis are not appreciated at this time. Additional nonobstructing calculi within the right kidney, measuring up to 0.7 cm.    Pt. seen and examined at bedside in NAD, sleeping comfortable. reports abdominal pain is still presents controlled with pain medications, denies fever chills. Pt. also states last time she had void yesterday, reports urgency. On exam apprears comfortable reports mild ttp, bladder palpable not distended.   [place]ian Language translation used ID # [id number] name Donna Goldstein.       MEDICATIONS  (STANDING):  cefTRIAXone   IVPB 2000 milliGRAM(s) IV Intermittent every 24 hours  lactated ringers. 500 milliLiter(s) (100 mL/Hr) IV Continuous <Continuous>  pantoprazole    Tablet 40 milliGRAM(s) Oral before breakfast    MEDICATIONS  (PRN):  acetaminophen     Tablet .. 650 milliGRAM(s) Oral every 6 hours PRN Temp greater or equal to 38C (100.4F), Mild Pain (1 - 3)  aluminum hydroxide/magnesium hydroxide/simethicone Suspension 30 milliLiter(s) Oral every 4 hours PRN Dyspepsia  ketorolac   Injectable 15 milliGRAM(s) IV Push every 8 hours PRN Moderate Pain (4 - 6)  melatonin 3 milliGRAM(s) Oral at bedtime PRN Insomnia  ondansetron Injectable 4 milliGRAM(s) IV Push every 8 hours PRN Nausea and/or Vomiting      REVIEW OF SYSTEMS   [x] A ten-point review of systems was otherwise negative except as noted.     Vital Signs Last 24 Hrs  T(C): 36.7 (26 Jul 2024 07:00), Max: 36.9 (25 Jul 2024 13:43)  T(F): 98 (26 Jul 2024 07:00), Max: 98.4 (25 Jul 2024 13:43)  HR: 82 (26 Jul 2024 07:00) (71 - 95)  BP: 94/63 (26 Jul 2024 07:00) (84/55 - 118/88)  BP(mean): 65 (26 Jul 2024 00:47) (65 - 65)  RR: 19 (26 Jul 2024 07:00) (17 - 19)  SpO2: 100% (26 Jul 2024 07:00) (98% - 100%)    Parameters below as of 26 Jul 2024 07:00  Patient On (Oxygen Delivery Method): room air        PHYSICAL EXAM:  GEN: NAD, awake and alert.  SKIN: Good color, non diaphoretic.  ABDO: Soft, NT/ND,  palpable not distended bladder, mild suprapubic tenderness.   BACK: + Right CVAT   EXT: [place] x 4     LABS:                        11.7   13.43 )-----------( 437      ( 25 Jul 2024 14:57 )             37.2     07-25    133<L>  |  98  |  14  ----------------------------<  143<H>  4.3   |  23  |  0.8    Ca    9.2      25 Jul 2024 14:57    TPro  9.0<H>  /  Alb  4.1  /  TBili  0.4  /  DBili  x   /  AST  18  /  ALT  10  /  AlkPhos  98  07-25    PT/INR - ( 25 Jul 2024 21:56 )   PT: 13.50 sec;   INR: 1.18 ratio         PTT - ( 25 Jul 2024 21:56 )  PTT:34.4 sec   Urinalysis with Rflx Culture (07.25.24 @ 14:57)    Urine Appearance: Clear   Color: Yellow   Specific Gravity: 1.023   pH Urine: 6.0   Protein, Urine: 30 mg/dL   Glucose Qualitative, Urine: Negative mg/dL   Ketone - Urine: Negative mg/dL   Blood, Urine: Negative   Bilirubin: Negative   Urobilinogen: 0.2 mg/dL   Leukocyte Esterase Concentration: Moderate   Nitrite: Negative    Urine Microscopic-Add On (NC) (07.25.24 @ 14:57)    Red Blood Cell - Urine: 0 /HPF   White Blood Cell - Urine: 93 /HPF   Bacteria: Occasional /HPF   Epithelial Cells: 1 /HPF   Cast: 4 /LPF          < from: CT Abdomen and Pelvis w/ IV Cont (07.25.24 @ 15:26) >  IMPRESSION:    Right delayed nephrogram, marked perinephric inflammatory stranding with   moderate hydronephrosis, minimal distention of renal pelvis, with right   UPJ 2.2 x 1.3 x 1.3 cm calculus. Additional right renal pelvis staghorn   calculus measuring 3.1 x 1.6 x 1 cm.  Findings compatible with   obstructing calculus with right severe pyelonephritis. No evidence of   abscess. Some imaging features raise the possibility of a chronic   process, however the typical characteristicsof xanthogranulomatous   pyelonephritis are not appreciated at this time.    Additional nonobstructing calculi within the right kidney, measuring up   to 0.7 cm.      --- End of Report ---      NA HUERTA MD; Resident Radiologist  This documenthas been electronically signed.  LISA BARKER MD; Attending Radiologist  This document has been electronically signed. Jul 25 2024  4:19PM    < end of copied text >       < from: US Transvaginal (07.25.24 @ 16:48) >    ACC: [id number] EXAM:  US TRANSVAGINAL   ORDERED BY: Vladislav Rendon     PROCEDURE DATE:  07/25/2024          INTERPRETATION:  CLINICAL INFORMATION: Abdominal pain    LMP: 6/25/2024    COMPARISON: Correlated with CT abdomen pelvis 7/25/2024.    TECHNIQUE:  Endovaginal and transabdominal pelvic sonogram. Color and Spectral   Doppler was performed.    FINDINGS:  Uterus: 5.1 x 2.2 x 3.1 cm. Nabothian cysts.  Endometrium: 5 mm. Within normal limits.    Right ovary: 2.9 x 2.1 x 2.4 cm. Volume is 8 cc. Within normal limits.   Doppler flow is demonstrated..  Left ovary: 3.2 x 2.0 x 1.8. Volume is 6 cc. Within normal limits.   Doppler flow is demonstrated..    Fluid: None.    IMPRESSION:    Cervical nabothian cysts.        --- End of Report ---      HAKEEM JOHANSEN MD; Resident Radiologist  This document has been electronically signed.  BECCA NIELSEN MD; Attending Radiologist  This document has been electronically signed. Jul 25 2024 11:22PM    < end of copied text >

## 2024-07-26 NOTE — CONSULT NOTE ADULT - SUBJECTIVE AND OBJECTIVE BOX
INTERVENTIONAL RADIOLOGY CONSULT:     Procedure Requested:     HPI:  28 yo F with PMH/PSH of appendectomy non-smoker, denies daily alcohol use, presents with right flank pain for the past 2 days, persistent, radiates to the right side of her back, associated w/ fever , chills and urinary hesitancy, denies measured fever, n/v/d, anorexia, cough, respiratory sx, change in bowel habits or other urinary sx, vaginal d/c or other associated complaints at present.     Patient reports she had a syncopal episode 1 month ago and was taken to a [facility] who had told her she had 2 Right kidney stone.She had other complaints, like nausea, weakness. Patient reports she was told to follow-up with urology; however, did not make an appointment yet. She was not prescribed abx.     3yo had toxoplasmosis tx.    In ED,  -Vital Signs Last 24 Hrs  T(C): 36.8 (25 Jul 2024 16:02), Max: 36.9 (25 Jul 2024 13:43)  T(F): 98.2 (25 Jul 2024 16:02), Max: 98.4 (25 Jul 2024 13:43)  HR: 93 (25 Jul 2024 16:02) (93 - 95)  BP: 106/67 (25 Jul 2024 18:37) (99/61 - 118/88)  BP(mean): --  RR: 18 (25 Jul 2024 16:02) (17 - 18)  SpO2: 98% (25 Jul 2024 16:02) (98% - 99%)    Parameters below as of 25 Jul 2024 16:02  Patient On (Oxygen Delivery Method): room air    -Labs : wbc 13.43 , hg 11.7 , plt 437 ,na 133, k4.3, crea 0.8   -Ua positive WBC93, RBC0, occ bacteria, moderate LE, neg nitrite   -transvaginal US neg for torsion  - CT A/P shows Right delayed nephrogram, marked perinephric inflammatory stranding with moderate hydronephrosis, minimal distention of renal pelvis, with right UPJ 2.2 x 1.3 x 1.3 cm calculus. Additional right renal pelvis staghorn calculus measuring 3.1 x 1.6 x 1 cm.  Findings compatible with obstructing calculus with right severe pyelonephritis. No evidence of abscess. Some imaging features raise the possibility of a chronic process, however the typical characteristics of xanthogranulomatous pyelonephritis are not appreciated at this time. Additional nonobstructing calculi within the right kidney, measuring up to 0.7 cm.  -Zofran IV push and 2g Rocephin IVPB, 15mg of Toradol IV push, IVF   -Urology consulted REC IR   -Patient admitted for management      (25 Jul 2024 18:48)      PAST MEDICAL & SURGICAL HISTORY:  No pertinent past medical history      History of appendectomy          MEDICATIONS  (STANDING):  cefTRIAXone   IVPB 2000 milliGRAM(s) IV Intermittent every 24 hours  lactated ringers. 500 milliLiter(s) (100 mL/Hr) IV Continuous <Continuous>  pantoprazole    Tablet 40 milliGRAM(s) Oral before breakfast    MEDICATIONS  (PRN):  acetaminophen     Tablet .. 650 milliGRAM(s) Oral every 6 hours PRN Temp greater or equal to 38C (100.4F), Mild Pain (1 - 3)  aluminum hydroxide/magnesium hydroxide/simethicone Suspension 30 milliLiter(s) Oral every 4 hours PRN Dyspepsia  ketorolac   Injectable 15 milliGRAM(s) IV Push every 8 hours PRN Moderate Pain (4 - 6)  melatonin 3 milliGRAM(s) Oral at bedtime PRN Insomnia  ondansetron Injectable 4 milliGRAM(s) IV Push every 8 hours PRN Nausea and/or Vomiting      Allergies    No Known Allergies    Intolerances        Social History:   Smoking: Yes [ ]  No [ ]   ______pk yrs  ETOH  Yes [ ]  No [ ]  Social [ ]  DRUGS:  Yes [ ]  No [ ]  if so what______________    FAMILY HISTORY:      Physical Exam:   Vital Signs Last 24 Hrs  T(C): 36.7 (26 Jul 2024 07:00), Max: 36.9 (25 Jul 2024 13:43)  T(F): 98 (26 Jul 2024 07:00), Max: 98.4 (25 Jul 2024 13:43)  HR: 82 (26 Jul 2024 07:00) (71 - 95)  BP: 94/63 (26 Jul 2024 07:00) (84/55 - 118/88)  BP(mean): 65 (26 Jul 2024 00:47) (65 - 65)  RR: 19 (26 Jul 2024 07:00) (17 - 19)  SpO2: 100% (26 Jul 2024 07:00) (98% - 100%)    Parameters below as of 26 Jul 2024 07:00  Patient On (Oxygen Delivery Method): room air        GENERAL: Resting comfortably in bed. NAD.  NEURO: Alert and oriented x3.  CARDIAC: Normal heart rate.  RESPIRATORY: Breathing comfortably on room air.  ABDOMEN: Soft, nontender.  EXTREMITIES: No peripheral edema.      Labs:                         11.7   13.43 )-----------( 437      ( 25 Jul 2024 14:57 )             37.2     07-25    133<L>  |  98  |  14  ----------------------------<  143<H>  4.3   |  23  |  0.8    Ca    9.2      25 Jul 2024 14:57    TPro  9.0<H>  /  Alb  4.1  /  TBili  0.4  /  DBili  x   /  AST  18  /  ALT  10  /  AlkPhos  98  07-25    PT/INR - ( 25 Jul 2024 21:56 )   PT: 13.50 sec;   INR: 1.18 ratio         PTT - ( 25 Jul 2024 21:56 )  PTT:34.4 sec    Pertinent labs:                      11.7   13.43 )-----------( 437      ( 25 Jul 2024 14:57 )             37.2       07-25    133<L>  |  98  |  14  ----------------------------<  143<H>  4.3   |  23  |  0.8    Ca    9.2      25 Jul 2024 14:57    TPro  9.0<H>  /  Alb  4.1  /  TBili  0.4  /  DBili  x   /  AST  18  /  ALT  10  /  AlkPhos  98  07-25      PT/INR - ( 25 Jul 2024 21:56 )   PT: 13.50 sec;   INR: 1.18 ratio         PTT - ( 25 Jul 2024 21:56 )  PTT:34.4 sec    Radiology & Additional Studies:     Radiology imaging reviewed.       ASSESSMENT/ PLAN:   28 yo F with PMH/PSH of appendectomy non-smoker, denies daily alcohol use, presents with right flank pain for the past 2 days, persistent, radiates to the right side of her back, associated w/ fever , chills and urinary hesitancy, denies measured fever, n/v/d, anorexia, cough, respiratory sx, change in bowel habits or other urinary sx, vaginal d/c or other associated complaints at present.     Patient reports she had a syncopal episode 1 month ago and was taken to a [facility] who had told her she had 2 Right kidney stone.She had other complaints, like nausea, weakness. Patient reports she was told to follow-up with urology; however, did not make an appointment yet. She was not prescribed abx.    Imaging demonstrated right delayed nephrogram, marked perinephric inflammatory stranding with moderate hydronephrosis, minimal distention of renal pelvis, with right UPJ 2.2 x 1.3 x 1.3 cm calculus. Additional right renal pelvis staghorn calculus measuring 3.1 x 1.6 x 1 cm. Findings compatible with obstructing calculus with right severe pyelonephritis. No evidence of abscess. Some imaging features raise the possibility of a chronic process, however the typical characteristics of xanthogranulomatous pyelonephritis are not appreciated at this time.    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  -No appreciable collection to drain, kidney with inflammatory changes, not amenable to PCN/PCNU placement at this time   -No IR intervention at this time  -c/w antibiotics   -Rest of care per primary team   -Can coordinate with urology for PCNL   -Will continue to follow    To contact Interventional Radiology with any questions, concerns or issues please utilize the following:  M-F 7am-5pm: [facility] [place] on teams,  consult spectra: [phone]  After hours/weekends/holidays: [phone]      INTERVENTIONAL RADIOLOGY CONSULT:     Procedure Requested:     HPI:  28 yo F with PMH/PSH of appendectomy non-smoker, denies daily alcohol use, presents with right flank pain for the past 2 days, persistent, radiates to the right side of her back, associated w/ fever , chills and urinary hesitancy, denies measured fever, n/v/d, anorexia, cough, respiratory sx, change in bowel habits or other urinary sx, vaginal d/c or other associated complaints at present.     Patient reports she had a syncopal episode 1 month ago and was taken to a [facility] who had told her she had 2 Right kidney stone.She had other complaints, like nausea, weakness. Patient reports she was told to follow-up with urology; however, did not make an appointment yet. She was not prescribed abx.     3yo had toxoplasmosis tx.    In ED,  -Vital Signs Last 24 Hrs  T(C): 36.8 (25 Jul 2024 16:02), Max: 36.9 (25 Jul 2024 13:43)  T(F): 98.2 (25 Jul 2024 16:02), Max: 98.4 (25 Jul 2024 13:43)  HR: 93 (25 Jul 2024 16:02) (93 - 95)  BP: 106/67 (25 Jul 2024 18:37) (99/61 - 118/88)  BP(mean): --  RR: 18 (25 Jul 2024 16:02) (17 - 18)  SpO2: 98% (25 Jul 2024 16:02) (98% - 99%)    Parameters below as of 25 Jul 2024 16:02  Patient On (Oxygen Delivery Method): room air    -Labs : wbc 13.43 , hg 11.7 , plt 437 ,na 133, k4.3, crea 0.8   -Ua positive WBC93, RBC0, occ bacteria, moderate LE, neg nitrite   -transvaginal US neg for torsion  - CT A/P shows Right delayed nephrogram, marked perinephric inflammatory stranding with moderate hydronephrosis, minimal distention of renal pelvis, with right UPJ 2.2 x 1.3 x 1.3 cm calculus. Additional right renal pelvis staghorn calculus measuring 3.1 x 1.6 x 1 cm.  Findings compatible with obstructing calculus with right severe pyelonephritis. No evidence of abscess. Some imaging features raise the possibility of a chronic process, however the typical characteristics of xanthogranulomatous pyelonephritis are not appreciated at this time. Additional nonobstructing calculi within the right kidney, measuring up to 0.7 cm.  -Zofran IV push and 2g Rocephin IVPB, 15mg of Toradol IV push, IVF   -Urology consulted REC IR   -Patient admitted for management      (25 Jul 2024 18:48)      PAST MEDICAL & SURGICAL HISTORY:  No pertinent past medical history      History of appendectomy          MEDICATIONS  (STANDING):  cefTRIAXone   IVPB 2000 milliGRAM(s) IV Intermittent every 24 hours  lactated ringers. 500 milliLiter(s) (100 mL/Hr) IV Continuous <Continuous>  pantoprazole    Tablet 40 milliGRAM(s) Oral before breakfast    MEDICATIONS  (PRN):  acetaminophen     Tablet .. 650 milliGRAM(s) Oral every 6 hours PRN Temp greater or equal to 38C (100.4F), Mild Pain (1 - 3)  aluminum hydroxide/magnesium hydroxide/simethicone Suspension 30 milliLiter(s) Oral every 4 hours PRN Dyspepsia  ketorolac   Injectable 15 milliGRAM(s) IV Push every 8 hours PRN Moderate Pain (4 - 6)  melatonin 3 milliGRAM(s) Oral at bedtime PRN Insomnia  ondansetron Injectable 4 milliGRAM(s) IV Push every 8 hours PRN Nausea and/or Vomiting      Allergies    No Known Allergies    Intolerances        Social History:   Smoking: Yes [ ]  No [ ]   ______pk yrs  ETOH  Yes [ ]  No [ ]  Social [ ]  DRUGS:  Yes [ ]  No [ ]  if so what______________    FAMILY HISTORY:      Physical Exam:   Vital Signs Last 24 Hrs  T(C): 36.7 (26 Jul 2024 07:00), Max: 36.9 (25 Jul 2024 13:43)  T(F): 98 (26 Jul 2024 07:00), Max: 98.4 (25 Jul 2024 13:43)  HR: 82 (26 Jul 2024 07:00) (71 - 95)  BP: 94/63 (26 Jul 2024 07:00) (84/55 - 118/88)  BP(mean): 65 (26 Jul 2024 00:47) (65 - 65)  RR: 19 (26 Jul 2024 07:00) (17 - 19)  SpO2: 100% (26 Jul 2024 07:00) (98% - 100%)    Parameters below as of 26 Jul 2024 07:00  Patient On (Oxygen Delivery Method): room air        GENERAL: Resting comfortably in bed. NAD.  NEURO: Alert and oriented x3.  CARDIAC: Normal heart rate.  RESPIRATORY: Breathing comfortably on room air.  ABDOMEN: Soft, nontender.  EXTREMITIES: No peripheral edema.      Labs:                         11.7   13.43 )-----------( 437      ( 25 Jul 2024 14:57 )             37.2     07-25    133<L>  |  98  |  14  ----------------------------<  143<H>  4.3   |  23  |  0.8    Ca    9.2      25 Jul 2024 14:57    TPro  9.0<H>  /  Alb  4.1  /  TBili  0.4  /  DBili  x   /  AST  18  /  ALT  10  /  AlkPhos  98  07-25    PT/INR - ( 25 Jul 2024 21:56 )   PT: 13.50 sec;   INR: 1.18 ratio         PTT - ( 25 Jul 2024 21:56 )  PTT:34.4 sec    Pertinent labs:                      11.7   13.43 )-----------( 437      ( 25 Jul 2024 14:57 )             37.2       07-25    133<L>  |  98  |  14  ----------------------------<  143<H>  4.3   |  23  |  0.8    Ca    9.2      25 Jul 2024 14:57    TPro  9.0<H>  /  Alb  4.1  /  TBili  0.4  /  DBili  x   /  AST  18  /  ALT  10  /  AlkPhos  98  07-25      PT/INR - ( 25 Jul 2024 21:56 )   PT: 13.50 sec;   INR: 1.18 ratio         PTT - ( 25 Jul 2024 21:56 )  PTT:34.4 sec    Radiology & Additional Studies:     Radiology imaging reviewed.       ASSESSMENT/ PLAN:   28 yo F with PMH/PSH of appendectomy non-smoker, denies daily alcohol use, presents with right flank pain for the past 2 days, persistent, radiates to the right side of her back, associated w/ fever , chills and urinary hesitancy, denies measured fever, n/v/d, anorexia, cough, respiratory sx, change in bowel habits or other urinary sx, vaginal d/c or other associated complaints at present.     Patient reports she had a syncopal episode 1 month ago and was taken to a [facility] who had told her she had 2 Right kidney stone.She had other complaints, like nausea, weakness. Patient reports she was told to follow-up with urology; however, did not make an appointment yet. She was not prescribed abx.    Imaging demonstrated right delayed nephrogram, marked perinephric inflammatory stranding with moderate hydronephrosis, minimal distention of renal pelvis, with right UPJ 2.2 x 1.3 x 1.3 cm calculus. Additional right renal pelvis staghorn calculus measuring 3.1 x 1.6 x 1 cm. Findings compatible with obstructing calculus with right severe pyelonephritis. No evidence of abscess. Some imaging features raise the possibility of a chronic process, however the typical characteristics of xanthogranulomatous pyelonephritis are not appreciated at this time.    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    -No IR intervention at this time  -c/w antibiotics   -Rest of care per primary team   -Can coordinate with urology for PCNL   -Will continue to follow    To contact Interventional Radiology with any questions, concerns or issues please utilize the following:  M-F 7am-5pm: [email or web address] on teams,  consult spectra: [phone]  After hours/weekends/holidays: [phone]

## 2024-07-26 NOTE — PROGRESS NOTE ADULT - NS ATTEND AMEND GEN_ALL_CORE FT
Case discussed with IR who was consulted for nephrostomy tube placement for access for PCNL.  Dr. Velez stated there is no need for immediate drainage at this time as there is no hydronephrosis.  Will place PCNU prior to procedure.  Patient however will need a course of antibiotics before PCNL can be performed.  Case discussed with Medical Attending.  Recommend f/up renal sonogram to make sure there is no hydronephrosis formation.

## 2024-07-26 NOTE — PROGRESS NOTE ADULT - ASSESSMENT
28 yo F with PMH/PSH of appendectomy non-smoker, denies daily alcohol use, presents with right flank pain for the past 2 days.     #right flank pain  #pyelonephritis  #kidney stones  #leukocytosis  -Labs : wbc 13.43 , hg 11.7 , plt 437 ,na 133, k4.3, crea 0.8   -Ua positive WBC93, RBC0, occ bacteria, moderate LE, neg nitrite   -CT A/P shows Right delayed nephrogram, marked perinephric inflammatory stranding with moderate hydronephrosis, minimal distention of renal pelvis, with right UPJ 2.2 x 1.3 x 1.3 cm calculus. Additional right renal pelvis staghorn calculus measuring 3.1 x 1.6 x 1 cm.  Findings compatible with obstructing calculus with right severe pyelonephritis. No evidence of abscess. Some imaging features raise the possibility of a chronic process, however the typical characteristics of xanthogranulomatous pyelonephritis are not appreciated at this time. Additional nonobstructing calculi within the right kidney, measuring up to 0.7 cm.  -Zofran IV push and 2g Rocephin IVPB, 15mg of Toradol IV push, IVF   -seen by uro in ED: recommend right PCN/PCNU placement by IR at this time  -IR: patient not a candidate for PCN due to inflammation, pending urology   -Continue IV abx - Rocephin   -pending ID consult  -Continue IVF, LR 100ml per hour for 12 hours  -Analgesics for pain control prn toradol and tylenol  -f/u UCx   -Patient will need to f/u outpatient with urology for definitive stone management once infection is resolved.   -if fever and chills take blood cultures     #anemia  -mcv 81   -OP f/u     #MISC  -DVT ppx: off for possible procedure   -GI ppx: ppi in setting of nsaid use   -DIET: regular , npo in case of procedure   -Activity: AAT  -Code Status: Full

## 2024-07-26 NOTE — PROGRESS NOTE ADULT - SUBJECTIVE AND OBJECTIVE BOX
VON CORNEJO  29y  [facility] 4[address] [id number] A      Patient is a 29y old  Female who presents with a chief complaint of Flank pain (26 Jul 2024 09:12)      My note supersedes the resident's note      INTERVAL HPI/OVERNIGHT EVENTS:    no acute events overnight     REVIEW OF SYSTEMS:  CONSTITUTIONAL: No fever, weight loss, or fatigue  EYES: No eye pain, visual disturbances, or discharge  ENMT:  No difficulty hearing, tinnitus, vertigo; No sinus or throat pain  NECK: No pain or stiffness  BREASTS: No pain, masses, or nipple discharge  RESPIRATORY: No cough, wheezing, chills or hemoptysis; No shortness of breath  CARDIOVASCULAR: No chest pain, palpitations, dizziness, or leg swelling  GASTROINTESTINAL: No abdominal or epigastric pain. No nausea, vomiting, or hematemesis; No diarrhea or constipation. No melena or hematochezia.  GENITOURINARY:  R flank pain   NEUROLOGICAL: No headaches, memory loss, loss of strength, numbness, or tremors  SKIN: No itching, burning, rashes, or lesions   LYMPH NODES: No enlarged glands  ENDOCRINE: No heat or cold intolerance; No hair loss  MUSCULOSKELETAL: No joint pain or swelling; No muscle, back, or extremity pain  PSYCHIATRIC: No depression, anxiety, mood swings, or difficulty sleeping  HEME/LYMPH: No easy bruising, or bleeding gums  ALLERY AND IMMUNOLOGIC: No hives or eczema  FAMILY HISTORY:    T(C): 36.7 (07-26-24 @ 07:00), Max: 36.9 (07-25-24 @ 13:43)  HR: 82 (07-26-24 @ 07:00) (71 - 95)  BP: 94/63 (07-26-24 @ 07:00) (84/55 - 118/88)  RR: 19 (07-26-24 @ 07:00) (17 - 19)  SpO2: 100% (07-26-24 @ 07:00) (98% - 100%)  Wt(kg): --Vital Signs Last 24 Hrs  T(C): 36.7 (26 Jul 2024 07:00), Max: 36.9 (25 Jul 2024 13:43)  T(F): 98 (26 Jul 2024 07:00), Max: 98.4 (25 Jul 2024 13:43)  HR: 82 (26 Jul 2024 07:00) (71 - 95)  BP: 94/63 (26 Jul 2024 07:00) (84/55 - 118/88)  BP(mean): 65 (26 Jul 2024 00:47) (65 - 65)  RR: 19 (26 Jul 2024 07:00) (17 - 19)  SpO2: 100% (26 Jul 2024 07:00) (98% - 100%)    Parameters below as of 26 Jul 2024 07:00  Patient On (Oxygen Delivery Method): room air        PHYSICAL EXAM:  GENERAL: NAD,   HEAD:  Atraumatic, Normocephalic  EYES: EOMI, PERRLA, conjunctiva and sclera clear  ENMT: No tonsillar erythema, exudates, or enlargement; Moist mucous membranes, Good dentition, No lesions  NECK: Supple, No JVD, Normal thyroid  NERVOUS SYSTEM:  Alert & Oriented X3,    PULM: Clear to auscultation bilaterally  CARDIAC: Regular rate and rhythm; No murmurs, rubs, or gallops  GI: Soft, Nontender, Nondistended; Bowel sounds present   R CVA tenderness   EXTREMITIES:  2+ Peripheral Pulses, No clubbing, cyanosis, or edema  LYMPH: No lymphadenopathy noted  SKIN: No rashes or lesions    Consultant(s) Notes Reviewed:  [x ] YES  [ ] NO  Care Discussed with Consultants/Other Providers [ x] YES  [ ] NO    LABS:                            11.7   13.43 )-----------( 437      ( 25 Jul 2024 14:57 )             37.2   07-25    133<L>  |  98  |  14  ----------------------------<  143<H>  4.3   |  23  |  0.8    Ca    9.2      25 Jul 2024 14:57    TPro  9.0<H>  /  Alb  4.1  /  TBili  0.4  /  DBili  x   /  AST  18  /  ALT  10  /  AlkPhos  98  07-25          Urinalysis with Rflx Culture (collected 25 Jul 2024 14:57)      acetaminophen     Tablet .. 650 milliGRAM(s) Oral every 6 hours PRN  aluminum hydroxide/magnesium hydroxide/simethicone Suspension 30 milliLiter(s) Oral every 4 hours PRN  cefTRIAXone   IVPB 2000 milliGRAM(s) IV Intermittent every 24 hours  ketorolac   Injectable 15 milliGRAM(s) IV Push every 8 hours PRN  lactated ringers. 500 milliLiter(s) IV Continuous <Continuous>  melatonin 3 milliGRAM(s) Oral at bedtime PRN  ondansetron Injectable 4 milliGRAM(s) IV Push every 8 hours PRN  pantoprazole    Tablet 40 milliGRAM(s) Oral before breakfast      HEALTH ISSUES - PROBLEM Dx:          Case Discussed with House Staff   Spectra [phone]

## 2024-07-26 NOTE — CONSULT NOTE ADULT - ASSESSMENT
ASSESSMENT  28 yo F with PMH/PSH of appendectomy non-smoker, denies daily alcohol use, presents with right flank pain for the past 2 days, persistent, radiates to the right side of her back, associated w/ fever , chills and urinary hesitancy, denies measured fever, n/v/d, anorexia, cough, respiratory sx, change in bowel habits or other urinary sx, vaginal d/c or other associated complaints at present.     IMPRESSION  #  On admission P>90 WBC 13    UA WBC 93   CT A/P shows Right delayed nephrogram, marked perinephric inflammatory stranding with moderate hydronephrosis, minimal distention of renal pelvis, with right UPJ 2.2 x 1.3 x 1.3 cm calculus. Additional right renal pelvis staghorn calculus measuring 3.1 x 1.6 x 1 cm.  Findings compatible with obstructing calculus with right severe pyelonephritis. No evidence of abscess. Some imaging features raise the possibility of a chronic process, however the typical characteristics of xanthogranulomatous pyelonephritis are not appreciated at this time. Additional nonobstructing calculi within the right kidney, measuring up to 0.7 cm.  #Hyponatremia  #Abx allergy: No Known Allergies    Creatinine: 0.8 (07-25-24 @ 14:57)      Weight (kg): 51 (07-25-24 @ 13:43)    RECOMMENDATIONS  This is an incomplete consult note. All final recommendations to follow after interview and examination of the patient. Please follow recommendations noted below.  - Appreciate Urology consult- recommend right PCN/PCNU placement by IR at this time       If any questions, please send a message or call on [employer] Teams  Please continue to update ID with any pertinent new laboratory, radiographic findings, or change in clinical status     ASSESSMENT  30 yo F with PMH/PSH of appendectomy non-smoker, denies daily alcohol use, presents with right flank pain for the past 2 days, persistent, radiates to the right side of her back, associated w/ fever , chills and urinary hesitancy, denies measured fever, n/v/d, anorexia, cough, respiratory sx, change in bowel habits or other urinary sx, vaginal d/c or other associated complaints at present.     IMPRESSION  #R pyelonephritis in the setting of calculi with Sepsis on admission P>90 WBC 13  On admission P>90 WBC 13    UA WBC 93   CT A/P shows Right delayed nephrogram, marked perinephric inflammatory stranding with moderate hydronephrosis, minimal distention of renal pelvis, with right UPJ 2.2 x 1.3 x 1.3 cm calculus. Additional right renal pelvis staghorn calculus measuring 3.1 x 1.6 x 1 cm.  Findings compatible with obstructing calculus with right severe pyelonephritis. No evidence of abscess. Some imaging features raise the possibility of a chronic process, however the typical characteristics of xanthogranulomatous pyelonephritis are not appreciated at this time. Additional nonobstructing calculi within the right kidney, measuring up to 0.7 cm.  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   #Abx allergy: No Known Allergies    Creatinine: 0.8 (07-25-24 @ 14:57)      Weight (kg): 51 (07-25-24 @ 13:43)    RECOMMENDATIONS  - Ceftriaxone 2g q24h IV  - f/u UCX, BCX  - Send HIV Ab/Ag CMIA   - Appreciate Urology consult- recommend right PCN/PCNU placement by IR at this time       If any questions, please send a message or call on [employer] Teams  Please continue to update ID with any pertinent new laboratory, radiographic findings, or change in clinical status

## 2024-07-26 NOTE — PROGRESS NOTE ADULT - SUBJECTIVE AND OBJECTIVE BOX
SUBJECTIVE:    Patient is a 29y old Female who presents with a chief complaint of Flank pain (2024 09:12)    Currently admitted to medicine with the primary diagnosis of Pyelonephritis     ID: [id number]  Today is hospital day 1d. Patient seen and examined at bedside. Patient continuing to endorse urinary hesitancy and R-sided abdominal/flank pain.     PAST MEDICAL & SURGICAL HISTORY  No pertinent past medical history    History of appendectomy      SOCIAL HISTORY:  Negative for smoking/alcohol/drug use.     ALLERGIES:  No Known Allergies    MEDICATIONS:  STANDING MEDICATIONS  cefTRIAXone   IVPB 2000 milliGRAM(s) IV Intermittent every 24 hours  lactated ringers. 500 milliLiter(s) IV Continuous <Continuous>  pantoprazole    Tablet 40 milliGRAM(s) Oral before breakfast    PRN MEDICATIONS  acetaminophen     Tablet .. 650 milliGRAM(s) Oral every 6 hours PRN  aluminum hydroxide/magnesium hydroxide/simethicone Suspension 30 milliLiter(s) Oral every 4 hours PRN  ketorolac   Injectable 15 milliGRAM(s) IV Push every 8 hours PRN  melatonin 3 milliGRAM(s) Oral at bedtime PRN  ondansetron Injectable 4 milliGRAM(s) IV Push every 8 hours PRN    VITALS:   T(F): 98  HR: 82  BP: 94/63  RR: 19  SpO2: 100%    PHYSICAL EXAM:  GENERAL: No acute distress  CHEST/LUNG: CTAB; No wheezes, rales, or rhonchi  HEART: Regular rate and rhythm; Normal s1 and s2  ABDOMEN: Soft, +R sided tenderness   EXTREMITIES:  2+ peripheral pulses b/l, No clubbing, cyanosis, or edema  NEUROLOGY: A&O x 3, no focal deficits  SKIN: No rashes or lesions      LABS:                        11.7   13.43 )-----------( 437      ( 2024 14:57 )             37.2     07-25    133<L>  |  98  |  14  ----------------------------<  143<H>  4.3   |  23  |  0.8    Ca    9.2      2024 14:57    TPro  9.0<H>  /  Alb  4.1  /  TBili  0.4  /  DBili  x   /  AST  18  /  ALT  10  /  AlkPhos  98  07-25    PT/INR - ( 2024 21:56 )   PT: 13.50 sec;   INR: 1.18 ratio         PTT - ( 2024 21:56 )  PTT:34.4 sec  Urinalysis Basic - ( 2024 14:57 )    Color: Yellow / Appearance: Clear / S.023 / pH: x  Gluc: 143 mg/dL / Ketone: Negative mg/dL  / Bili: Negative / Urobili: 0.2 mg/dL   Blood: x / Protein: 30 mg/dL / Nitrite: Negative   Leuk Esterase: Moderate / RBC: 0 /HPF / WBC 93 /HPF   Sq Epi: x / Non Sq Epi: 1 /HPF / Bacteria: Occasional /HPF              Urinalysis with Rflx Culture (collected 2024 14:57)

## 2024-07-26 NOTE — PATIENT PROFILE ADULT - NSPROMEDSADMININFO_GEN_A_NUR
Spoke with Maddison Emerita Hernandez, Clinical Pharmacist     no concerns

## 2024-07-26 NOTE — CONSULT NOTE ADULT - NS ATTEST RISK PROBLEM GEN_ALL_CORE FT
- Patient has an illness which poses a threat to life or bodily function without treatment, sepsis     I have personally seen and examined this patient.  I have reviewed all pertinent clinical information and reviewed all relevant imaging and diagnostic studies personally.   If possible, I counseled the patient about diagnostic testing and treatment plan.   I discussed my recommendations with the primary team and any family members at bedside.

## 2024-07-26 NOTE — CONSULT NOTE ADULT - SUBJECTIVE AND OBJECTIVE BOX
VON CORNEJO  29y, Female  Allergy: No Known Allergies      CHIEF COMPLAINT:       LOS  1d    HPI  HPI:  28 yo F with PMH/PSH of appendectomy non-smoker, denies daily alcohol use, presents with right flank pain for the past 2 days, persistent, radiates to the right side of her back, associated w/ fever , chills and urinary hesitancy, denies measured fever, n/v/d, anorexia, cough, respiratory sx, change in bowel habits or other urinary sx, vaginal d/c or other associated complaints at present.     Patient reports she had a syncopal episode 1 month ago and was taken to a [facility] who had told her she had 2 Right kidney stone.She had other complaints, like nausea, weakness. Patient reports she was told to follow-up with urology; however, did not make an appointment yet. She was not prescribed abx.     3yo had toxoplasmosis tx.    In ED,  -Vital Signs Last 24 Hrs  T(C): 36.8 (2024 16:02), Max: 36.9 (2024 13:43)  T(F): 98.2 (2024 16:02), Max: 98.4 (2024 13:43)  HR: 93 (2024 16:02) (93 - 95)  BP: 106/67 (2024 18:37) (99/61 - 118/88)  BP(mean): --  RR: 18 (2024 16:02) (17 - 18)  SpO2: 98% (2024 16:02) (98% - 99%)    Parameters below as of 2024 16:02  Patient On (Oxygen Delivery Method): room air    -Labs : wbc 13.43 , hg 11.7 , plt 437 ,na 133, k4.3, crea 0.8   -Ua positive WBC93, RBC0, occ bacteria, moderate LE, neg nitrite   -transvaginal US neg for torsion  - CT A/P shows Right delayed nephrogram, marked perinephric inflammatory stranding with moderate hydronephrosis, minimal distention of renal pelvis, with right UPJ 2.2 x 1.3 x 1.3 cm calculus. Additional right renal pelvis staghorn calculus measuring 3.1 x 1.6 x 1 cm.  Findings compatible with obstructing calculus with right severe pyelonephritis. No evidence of abscess. Some imaging features raise the possibility of a chronic process, however the typical characteristics of xanthogranulomatous pyelonephritis are not appreciated at this time. Additional nonobstructing calculi within the right kidney, measuring up to 0.7 cm.  -Zofran IV push and 2g Rocephin IVPB, 15mg of Toradol IV push, IVF   -Urology consulted REC IR   -Patient admitted for management      (2024 18:48)      INFECTIOUS DISEASE HISTORY:  ID consulted for pyelonephritis     Currently ordered for:  cefTRIAXone   IVPB 2000 milliGRAM(s) IV Intermittent every 24 hours      PMH  PAST MEDICAL & SURGICAL HISTORY:  No pertinent past medical history      History of appendectomy          FAMILY HISTORY  non-contributory     SOCIAL HISTORY  Social History:  denies IVDU      ROS  ***    VITALS:  T(F): 98, Max: 98.4 (24 @ 13:43)  HR: 82  BP: 94/63  RR: 19Vital Signs Last 24 Hrs  T(C): 36.7 (2024 07:00), Max: 36.9 (2024 13:43)  T(F): 98 (2024 07:00), Max: 98.4 (2024 13:43)  HR: 82 (2024 07:00) (71 - 95)  BP: 94/63 (2024 07:00) (84/55 - 118/88)  BP(mean): 65 (2024 00:47) (65 - 65)  RR: 19 (2024 07:00) (17 - 19)  SpO2: 100% (2024 07:00) (98% - 100%)    Parameters below as of 2024 07:00  Patient On (Oxygen Delivery Method): room air        PHYSICAL EXAM:  ***    TESTS & MEASUREMENTS:                        11.7   13.43 )-----------( 437      ( 2024 14:57 )             37.2     07-25    133<L>  |  98  |  14  ----------------------------<  143<H>  4.3   |  23  |  0.8    Ca    9.2      2024 14:57    TPro  9.0<H>  /  Alb  4.1  /  TBili  0.4  /  DBili  x   /  AST  18  /  ALT  10  /  AlkPhos  98        LIVER FUNCTIONS - ( 2024 14:57 )  Alb: 4.1 g/dL / Pro: 9.0 g/dL / ALK PHOS: 98 U/L / ALT: 10 U/L / AST: 18 U/L / GGT: x           Urinalysis Basic - ( 2024 14:57 )    Color: Yellow / Appearance: Clear / S.023 / pH: x  Gluc: 143 mg/dL / Ketone: Negative mg/dL  / Bili: Negative / Urobili: 0.2 mg/dL   Blood: x / Protein: 30 mg/dL / Nitrite: Negative   Leuk Esterase: Moderate / RBC: 0 /HPF / WBC 93 /HPF   Sq Epi: x / Non Sq Epi: 1 /HPF / Bacteria: Occasional /HPF        Urinalysis with Rflx Culture (collected 24 @ 14:57)            INFECTIOUS DISEASES TESTING      INFLAMMATORY MARKERS      RADIOLOGY & ADDITIONAL TESTS:  I have personally reviewed the last Chest xray  CXR      CT  CT Abdomen and Pelvis w/ IV Cont:   ACC: [id number] EXAM:  CT ABDOMEN AND PELVIS IC   ORDERED BY: Vladislav Rendon     PROCEDURE DATE:  2024          INTERPRETATION:  CLINICAL INFORMATION: Right Flank pain.    COMPARISON: None.    CONTRAST/COMPLICATIONS:  IV Contrast: Omnipaque [id number] cc administered   0 cc discarded  Oral Contrast: NONE  Complications: None reported at time of study completion    PROCEDURE:  CT of the Abdomen and Pelvis was performed.  Sagittal and coronal reformats were performed.    FINDINGS:  LOWER CHEST: Within normal limits.    LIVER: Within normal limits.  BILE DUCTS: Normal caliber.  GALLBLADDER: Within normal limits.  SPLEEN: Within normal limits.  PANCREAS: Within normal limits.  ADRENALS: Within normal limits.  KIDNEYS/URETERS:  Right delayed nephrogram, marked perinephric inflammatory stranding with   moderate hydronephrosis, minimal distention of renal pelvis, with right   UPJ 2.2 x 1.3 x 1.3 cm calculus (average Hounsfield units 1000).   Additional right renal pelvis staghorn calculus measuring 3.1 x 1.6 x 1   cm. Additional nonobstructing calculi within the right kidney, measuring   up to 0.7 cm.    Left kidney unremarkable.    BLADDER: Within normal limits.  REPRODUCTIVE ORGANS: Unremarkable at CT.    BOWEL: No bowel obstruction. Appendix not visualized, possibly resected.  PERITONEUM/RETROPERITONEUM: Likely reactive prominent retroperitoneal   lymph nodes.  VESSELS: Within normal limits.  LYMPH NODES: No lymphadenopathy.  ABDOMINAL WALL: Within normal limits.  BONES: Within normal limits.    IMPRESSION:    Right delayed nephrogram, marked perinephric inflammatory stranding with   moderate hydronephrosis, minimal distention of renal pelvis, with right   UPJ 2.2 x 1.3 x 1.3 cm calculus. Additional right renal pelvis staghorn   calculus measuring 3.1 x 1.6 x 1 cm.  Findings compatible with   obstructing calculus with right severe pyelonephritis. No evidence of   abscess. Some imaging features raise the possibility of a chronic   process, however the typical characteristicsof xanthogranulomatous   pyelonephritis are not appreciated at this time.    Additional nonobstructing calculi within the right kidney, measuring up   to 0.7 cm.      --- End of Report ---          NA HUERTA MD; Resident Radiologist  This documenthas been electronically signed.  LISA BARKER MD; Attending Radiologist  This document has been electronically signed. 2024  4:19PM (24 @ 15:26)      CARDIOLOGY TESTING  12 Lead ECG:   Ventricular Rate 97 BPM    Atrial Rate 97 BPM    P-R Interval 152 ms <TRUNCATED> (24 @ 20:58)       MEDICATIONS  cefTRIAXone   IVPB 2000 IV Intermittent every 24 hours  lactated ringers. 500 IV Continuous <Continuous>  pantoprazole    Tablet 40 Oral before breakfast      ANTIBIOTICS:  cefTRIAXone   IVPB 2000 milliGRAM(s) IV Intermittent every 24 hours      ALLERGIES:  No Known Allergies       VON CORNEJO  29y, Female  Allergy: No Known Allergies      CHIEF COMPLAINT:       LOS  1d    HPI  HPI:  28 yo F with PMH/PSH of appendectomy non-smoker, denies daily alcohol use, presents with right flank pain for the past 2 days, persistent, radiates to the right side of her back, associated w/ fever , chills and urinary hesitancy, denies measured fever, n/v/d, anorexia, cough, respiratory sx, change in bowel habits or other urinary sx, vaginal d/c or other associated complaints at present.     Patient reports she had a syncopal episode 1 month ago and was taken to a [facility] who had told her she had 2 Right kidney stone.She had other complaints, like nausea, weakness. Patient reports she was told to follow-up with urology; however, did not make an appointment yet. She was not prescribed abx.     3yo had toxoplasmosis tx.    In ED,  -Vital Signs Last 24 Hrs  T(C): 36.8 (2024 16:02), Max: 36.9 (2024 13:43)  T(F): 98.2 (2024 16:02), Max: 98.4 (2024 13:43)  HR: 93 (2024 16:02) (93 - 95)  BP: 106/67 (2024 18:37) (99/61 - 118/88)  BP(mean): --  RR: 18 (2024 16:02) (17 - 18)  SpO2: 98% (2024 16:02) (98% - 99%)    Parameters below as of 2024 16:02  Patient On (Oxygen Delivery Method): room air    -Labs : wbc 13.43 , hg 11.7 , plt 437 ,na 133, k4.3, crea 0.8   -Ua positive WBC93, RBC0, occ bacteria, moderate LE, neg nitrite   -transvaginal US neg for torsion  - CT A/P shows Right delayed nephrogram, marked perinephric inflammatory stranding with moderate hydronephrosis, minimal distention of renal pelvis, with right UPJ 2.2 x 1.3 x 1.3 cm calculus. Additional right renal pelvis staghorn calculus measuring 3.1 x 1.6 x 1 cm.  Findings compatible with obstructing calculus with right severe pyelonephritis. No evidence of abscess. Some imaging features raise the possibility of a chronic process, however the typical characteristics of xanthogranulomatous pyelonephritis are not appreciated at this time. Additional nonobstructing calculi within the right kidney, measuring up to 0.7 cm.  -Zofran IV push and 2g Rocephin IVPB, 15mg of Toradol IV push, IVF   -Urology consulted REC IR   -Patient admitted for management      (2024 18:48)      INFECTIOUS DISEASE HISTORY:  ID consulted for pyelonephritis   Reports pain with urination and R CVAT    From 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 - reports had vision issues  Denies hx HIV    Currently ordered for:  cefTRIAXone   IVPB 2000 milliGRAM(s) IV Intermittent every 24 hours      PMH  PAST MEDICAL & SURGICAL HISTORY:  No pertinent past medical history      History of appendectomy          FAMILY HISTORY  non-contributory     SOCIAL HISTORY  Social History:  denies IVDU      ROS  General: Denies rigors, nightsweats  HEENT: Denies headache, rhinorrhea, sore throat, eye pain  CV: Denies CP, palpitations  PULM: Denies wheezing, hemoptysis  GI: Denies hematemesis, hematochezia, melena  : as noted above   MSK: Denies arthralgias, myalgias  SKIN: Denies rash, lesions  NEURO: Denies paresthesias, weakness  PSYCH: Denies depression, anxiety     VITALS:  T(F): 98, Max: 98.4 (24 @ 13:43)  HR: 82  BP: 94/63  RR: 19Vital Signs Last 24 Hrs  T(C): 36.7 (2024 07:00), Max: 36.9 (2024 13:43)  T(F): 98 (2024 07:00), Max: 98.4 (2024 13:43)  HR: 82 (2024 07:00) (71 - 95)  BP: 94/63 (2024 07:00) (84/55 - 118/88)  BP(mean): 65 (2024 00:47) (65 - 65)  RR: 19 (2024 07:00) (17 - 19)  SpO2: 100% (2024 07:00) (98% - 100%)    Parameters below as of 2024 07:00  Patient On (Oxygen Delivery Method): room air        PHYSICAL EXAM:  Gen: NAD, resting in bed  HEENT: Normocephalic, atraumatic, corneal clouding/spots  Neck: supple, no lymphadenopathy  CV: Regular rate & regular rhythm  Lungs: decreased BS at bases, no fremitus  Abdomen: Soft, BS present R CVAT  Ext: Warm, well perfused  Neuro: non focal, awake  Skin: no rash, no erythema, many tattoos  Lines: no phlebitis     TESTS & MEASUREMENTS:                        11.7   13.43 )-----------( 437      ( 2024 14:57 )             37.2         133<L>  |  98  |  14  ----------------------------<  143<H>  4.3   |  23  |  0.8    Ca    9.2      2024 14:57    TPro  9.0<H>  /  Alb  4.1  /  TBili  0.4  /  DBili  x   /  AST  18  /  ALT  10  /  AlkPhos  98  07-25      LIVER FUNCTIONS - ( 2024 14:57 )  Alb: 4.1 g/dL / Pro: 9.0 g/dL / ALK PHOS: 98 U/L / ALT: 10 U/L / AST: 18 U/L / GGT: x           Urinalysis Basic - ( 2024 14:57 )    Color: Yellow / Appearance: Clear / S.023 / pH: x  Gluc: 143 mg/dL / Ketone: Negative mg/dL  / Bili: Negative / Urobili: 0.2 mg/dL   Blood: x / Protein: 30 mg/dL / Nitrite: Negative   Leuk Esterase: Moderate / RBC: 0 /HPF / WBC 93 /HPF   Sq Epi: x / Non Sq Epi: 1 /HPF / Bacteria: Occasional /HPF        Urinalysis with Rflx Culture (collected 24 @ 14:57)            INFECTIOUS DISEASES TESTING      INFLAMMATORY MARKERS      RADIOLOGY & ADDITIONAL TESTS:  I have personally reviewed the last Chest xray  CXR      CT  CT Abdomen and Pelvis w/ IV Cont:   ACC: [id number] EXAM:  CT ABDOMEN AND PELVIS IC   ORDERED BY: Vladislav Rendon     PROCEDURE DATE:  2024          INTERPRETATION:  CLINICAL INFORMATION: Right Flank pain.    COMPARISON: None.    CONTRAST/COMPLICATIONS:  IV Contrast: Omnipaque [id number] cc administered   0 cc discarded  Oral Contrast: NONE  Complications: None reported at time of study completion    PROCEDURE:  CT of the Abdomen and Pelvis was performed.  Sagittal and coronal reformats were performed.    FINDINGS:  LOWER CHEST: Within normal limits.    LIVER: Within normal limits.  BILE DUCTS: Normal caliber.  GALLBLADDER: Within normal limits.  SPLEEN: Within normal limits.  PANCREAS: Within normal limits.  ADRENALS: Within normal limits.  KIDNEYS/URETERS:  Right delayed nephrogram, marked perinephric inflammatory stranding with   moderate hydronephrosis, minimal distention of renal pelvis, with right   UPJ 2.2 x 1.3 x 1.3 cm calculus (average Hounsfield units 1000).   Additional right renal pelvis staghorn calculus measuring 3.1 x 1.6 x 1   cm. Additional nonobstructing calculi within the right kidney, measuring   up to 0.7 cm.    Left kidney unremarkable.    BLADDER: Within normal limits.  REPRODUCTIVE ORGANS: Unremarkable at CT.    BOWEL: No bowel obstruction. Appendix not visualized, possibly resected.  PERITONEUM/RETROPERITONEUM: Likely reactive prominent retroperitoneal   lymph nodes.  VESSELS: Within normal limits.  LYMPH NODES: No lymphadenopathy.  ABDOMINAL WALL: Within normal limits.  BONES: Within normal limits.    IMPRESSION:    Right delayed nephrogram, marked perinephric inflammatory stranding with   moderate hydronephrosis, minimal distention of renal pelvis, with right   UPJ 2.2 x 1.3 x 1.3 cm calculus. Additional right renal pelvis staghorn   calculus measuring 3.1 x 1.6 x 1 cm.  Findings compatible with   obstructing calculus with right severe pyelonephritis. No evidence of   abscess. Some imaging features raise the possibility of a chronic   process, however the typical characteristicsof xanthogranulomatous   pyelonephritis are not appreciated at this time.    Additional nonobstructing calculi within the right kidney, measuring up   to 0.7 cm.      --- End of Report ---          NA HUERTA MD; Resident Radiologist  This documenthas been electronically signed.  LISA BARKER MD; Attending Radiologist  This document has been electronically signed. 2024  4:19PM (24 @ 15:26)      CARDIOLOGY TESTING  12 Lead ECG:   Ventricular Rate 97 BPM    Atrial Rate 97 BPM    P-R Interval 152 ms <TRUNCATED> (24 @ 20:58)       MEDICATIONS  cefTRIAXone   IVPB 2000 IV Intermittent every 24 hours  lactated ringers. 500 IV Continuous <Continuous>  pantoprazole    Tablet 40 Oral before breakfast      ANTIBIOTICS:  cefTRIAXone   IVPB 2000 milliGRAM(s) IV Intermittent every 24 hours      ALLERGIES:  No Known Allergies

## 2024-07-26 NOTE — PROGRESS NOTE ADULT - ASSESSMENT
HPI:  30 yo F with PMH/PSH of appendectomy non-smoker, denies daily alcohol use, presents with right flank pain for the past 2 days, persistent, radiates to the right side of her back, associated w/ fever , chills and urinary hesitancy, denies measured fever, n/v/d, anorexia, cough, respiratory sx, change in bowel habits or other urinary sx, vaginal d/c or other associated complaints at present.     Patient reports she had a syncopal episode 1 month ago and was taken to a [facility] who had told her she had 2 Right kidney stone.She had other complaints, like nausea, weakness. Patient reports she was told to follow-up with urology; however, did not make an appointment yet. She was not prescribed abx.     3yo had toxoplasmosis tx.    # Flank pain secondary to Right renal calculi with right pyelonephritis.  ceftriaxone   Urology follow up , as IR cannot perform procedure     # Hyponatremia no intervnetion     PROGRESS NOTE HANDOFF    Pending:  urology follow up     Family discussion: patient verbalized understanding and agreeable to plan of care     Disposition: Home

## 2024-07-26 NOTE — PROGRESS NOTE ADULT - ASSESSMENT
Patient is a 28 yo F presents to ED with Right flank pain for 2 days-  c/s for CT finding of Right renal calculi with right pyelonephritis.   CT A/P shows Right delayed nephrogram, marked perinephric inflammatory stranding with moderate hydronephrosis, minimal distention of renal pelvis, with right UPJ 2.2 x 1.3 x 1.3 cm calculus. Additional right renal pelvis staghorn calculus measuring 3.1 x 1.6 x 1 cm.  Findings compatible with obstructing calculus with right severe pyelonephritis. No evidence of abscess. Some imaging features raise the possibility of a chronic process, however the typical characteristics of xanthogranulomatous pyelonephritis are not appreciated at this time. Additional nonobstructing calculi within the right kidney, measuring up to 0.7 cm.    Pt. seen and examined at bedside in NAD, sleeping comfortable. reports abdominal pain is still presents controlled with pain medications, denies fever chills. Pt. also states last time she had void yesterday, reports urgency. On exam apprears comfortable reports mild ttp, bladder palpable not distended.   [place] Language translation used ID # [id number] name Donna Goldstein.       Plan:  IR evaluation appreciated, case discussed between Dr. Carter and Dr. Velez.   Infection needs to be treated. PCNU placement will be done by IR prior to PCNL scheduled with Dr. Carter as Outpatient.   Cont. Current management with IV Abx   Bladder scan to eval PVR   F/U Urine cx adjust Abx according C&S  F/U ID recommendations  Consider GYN eval  x Transvaginal US findings of Cervical nabothian cysts.  Case d/w Dr. Carter

## 2024-07-27 LAB
ALBUMIN SERPL ELPH-MCNC: 3.5 G/DL — SIGNIFICANT CHANGE UP (ref 3.5–5.2)
ALP SERPL-CCNC: 89 U/L — SIGNIFICANT CHANGE UP (ref 30–115)
ALT FLD-CCNC: 9 U/L — SIGNIFICANT CHANGE UP (ref 0–41)
ANION GAP SERPL CALC-SCNC: 12 MMOL/L — SIGNIFICANT CHANGE UP (ref 7–14)
AST SERPL-CCNC: 12 U/L — SIGNIFICANT CHANGE UP (ref 0–41)
BILIRUB SERPL-MCNC: 0.4 MG/DL — SIGNIFICANT CHANGE UP (ref 0.2–1.2)
BUN SERPL-MCNC: 19 MG/DL — SIGNIFICANT CHANGE UP (ref 10–20)
CALCIUM SERPL-MCNC: 8.3 MG/DL — LOW (ref 8.4–10.5)
CHLORIDE SERPL-SCNC: 103 MMOL/L — SIGNIFICANT CHANGE UP (ref 98–110)
CO2 SERPL-SCNC: 24 MMOL/L — SIGNIFICANT CHANGE UP (ref 17–32)
CREAT SERPL-MCNC: 1.3 MG/DL — SIGNIFICANT CHANGE UP (ref 0.7–1.5)
EGFR: 57 ML/MIN/1.73M2 — LOW
GLUCOSE SERPL-MCNC: 94 MG/DL — SIGNIFICANT CHANGE UP (ref 70–99)
HCT VFR BLD CALC: 32.8 % — LOW (ref 37–47)
HGB BLD-MCNC: 10.1 G/DL — LOW (ref 12–16)
MAGNESIUM SERPL-MCNC: 2.5 MG/DL — HIGH (ref 1.8–2.4)
MCHC RBC-ENTMCNC: 25.3 PG — LOW (ref 27–31)
MCHC RBC-ENTMCNC: 30.8 G/DL — LOW (ref 32–37)
MCV RBC AUTO: 82.2 FL — SIGNIFICANT CHANGE UP (ref 81–99)
NRBC # BLD: 0 /100 WBCS — SIGNIFICANT CHANGE UP (ref 0–0)
PLATELET # BLD AUTO: 425 K/UL — HIGH (ref 130–400)
PMV BLD: 9.2 FL — SIGNIFICANT CHANGE UP (ref 7.4–10.4)
POTASSIUM SERPL-MCNC: 4.4 MMOL/L — SIGNIFICANT CHANGE UP (ref 3.5–5)
POTASSIUM SERPL-SCNC: 4.4 MMOL/L — SIGNIFICANT CHANGE UP (ref 3.5–5)
PROT SERPL-MCNC: 7.4 G/DL — SIGNIFICANT CHANGE UP (ref 6–8)
RBC # BLD: 3.99 M/UL — LOW (ref 4.2–5.4)
RBC # FLD: 15.8 % — HIGH (ref 11.5–14.5)
SODIUM SERPL-SCNC: 139 MMOL/L — SIGNIFICANT CHANGE UP (ref 135–146)
WBC # BLD: 10.75 K/UL — SIGNIFICANT CHANGE UP (ref 4.8–10.8)
WBC # FLD AUTO: 10.75 K/UL — SIGNIFICANT CHANGE UP (ref 4.8–10.8)

## 2024-07-27 PROCEDURE — 99233 SBSQ HOSP IP/OBS HIGH 50: CPT

## 2024-07-27 RX ADMIN — Medication 100 MILLIGRAM(S): at 05:44

## 2024-07-27 RX ADMIN — Medication 650 MILLIGRAM(S): at 15:55

## 2024-07-27 RX ADMIN — PANTOPRAZOLE SODIUM 40 MILLIGRAM(S): 20 TABLET, DELAYED RELEASE ORAL at 05:40

## 2024-07-27 RX ADMIN — Medication 650 MILLIGRAM(S): at 16:25

## 2024-07-27 NOTE — PROGRESS NOTE ADULT - SUBJECTIVE AND OBJECTIVE BOX
VON CORNEJO  29y, Female  Allergy: No Known Allergies      LOS  2d    CHIEF COMPLAINT: Flank pain (26 Jul 2024 09:12)      INTERVAL EVENTS/HPI  - No acute events overnight, reports feeling better   - T(F): , Max: 98.7 (07-27-24 @ 06:55)  - Denies any worsening symptoms  - Tolerating medication  - WBC Count: 10.75 (07-27-24 @ 08:35)  WBC Count: 11.77 (07-26-24 @ 11:04)     - Creatinine: 1.3 (07-27-24 @ 08:35)  Creatinine: 1.0 (07-26-24 @ 11:04)       ROS  General: Denies rigors, nightsweats  HEENT: Denies headache, rhinorrhea, sore throat, eye pain  CV: Denies CP, palpitations  PULM: Denies wheezing, hemoptysis  GI: Denies hematemesis, hematochezia, melena  : Denies discharge, hematuria  MSK: Denies arthralgias, myalgias  SKIN: Denies rash, lesions  NEURO: Denies paresthesias, weakness  PSYCH: Denies depression, anxiety    VITALS:  T(F): 97.5, Max: 98.7 (07-27-24 @ 06:55)  HR: 78  BP: 96/62  RR: 18Vital Signs Last 24 Hrs  T(C): 36.4 (27 Jul 2024 07:30), Max: 37.1 (27 Jul 2024 06:55)  T(F): 97.5 (27 Jul 2024 07:30), Max: 98.7 (27 Jul 2024 06:55)  HR: 78 (27 Jul 2024 07:30) (78 - 99)  BP: 96/62 (27 Jul 2024 07:30) (96/62 - 104/70)  BP(mean): --  RR: 18 (27 Jul 2024 06:55) (18 - 18)  SpO2: 99% (27 Jul 2024 06:55) (99% - 99%)    Parameters below as of 27 Jul 2024 06:55  Patient On (Oxygen Delivery Method): room air        PHYSICAL EXAM:  Gen: NAD, resting in bed  HEENT: Normocephalic, atraumatic  Neck: supple, no lymphadenopathy  CV: Regular rate & regular rhythm  Lungs: decreased BS at bases, no fremitus  Abdomen: Soft, BS present, decreased R CVAT  Ext: Warm, well perfused  Neuro: non focal, awake  Skin: no rash, no erythema  Lines: no phlebitis    FH: Non-contributory  Social Hx: Non-contributory    TESTS & MEASUREMENTS:                        10.1   10.75 )-----------( 425      ( 27 Jul 2024 08:35 )             32.8     07-27    139  |  103  |  19  ----------------------------<  94  4.4   |  24  |  1.3    Ca    8.3<L>      27 Jul 2024 08:35  Mg     2.5     07-27    TPro  7.4  /  Alb  3.5  /  TBili  0.4  /  DBili  x   /  AST  12  /  ALT  9   /  AlkPhos  89  07-27      LIVER FUNCTIONS - ( 27 Jul 2024 08:35 )  Alb: 3.5 g/dL / Pro: 7.4 g/dL / ALK PHOS: 89 U/L / ALT: 9 U/L / AST: 12 U/L / GGT: x           Urinalysis Basic - ( 27 Jul 2024 08:35 )    Color: x / Appearance: x / SG: x / pH: x  Gluc: 94 mg/dL / Ketone: x  / Bili: x / Urobili: x   Blood: x / Protein: x / Nitrite: x   Leuk Esterase: x / RBC: x / WBC x   Sq Epi: x / Non Sq Epi: x / Bacteria: x        Urinalysis with Rflx Culture (collected 07-25-24 @ 14:57)    Culture - Urine (collected 07-25-24 @ 14:57)  Source: Clean Catch None  Final Report (07-26-24 @ 18:01):    No growth            INFECTIOUS DISEASES TESTING      INFLAMMATORY MARKERS      RADIOLOGY & ADDITIONAL TESTS:  I have personally reviewed the last available Chest xray  CXR      CT  CT Abdomen and Pelvis w/ IV Cont:   ACC: [id number] EXAM:  CT ABDOMEN AND PELVIS IC   ORDERED BY: Vladislav Rendon     PROCEDURE DATE:  07/25/2024          INTERPRETATION:  CLINICAL INFORMATION: Right Flank pain.    COMPARISON: None.    CONTRAST/COMPLICATIONS:  IV Contrast: Omnipaque [id number] cc administered   0 cc discarded  Oral Contrast: NONE  Complications: None reported at time of study completion    PROCEDURE:  CT of the Abdomen and Pelvis was performed.  Sagittal and coronal reformats were performed.    FINDINGS:  LOWER CHEST: Within normal limits.    LIVER: Within normal limits.  BILE DUCTS: Normal caliber.  GALLBLADDER: Within normal limits.  SPLEEN: Within normal limits.  PANCREAS: Within normal limits.  ADRENALS: Within normal limits.  KIDNEYS/URETERS:  Right delayed nephrogram, marked perinephric inflammatory stranding with   moderate hydronephrosis, minimal distention of renal pelvis, with right   UPJ 2.2 x 1.3 x 1.3 cm calculus (average Hounsfield units 1000).   Additional right renal pelvis staghorn calculus measuring 3.1 x 1.6 x 1   cm. Additional nonobstructing calculi within the right kidney, measuring   up to 0.7 cm.    Left kidney unremarkable.    BLADDER: Within normal limits.  REPRODUCTIVE ORGANS: Unremarkable at CT.    BOWEL: No bowel obstruction. Appendix not visualized, possibly resected.  PERITONEUM/RETROPERITONEUM: Likely reactive prominent retroperitoneal   lymph nodes.  VESSELS: Within normal limits.  LYMPH NODES: No lymphadenopathy.  ABDOMINAL WALL: Within normal limits.  BONES: Within normal limits.    IMPRESSION:    Right delayed nephrogram, marked perinephric inflammatory stranding with   moderate hydronephrosis, minimal distention of renal pelvis, with right   UPJ 2.2 x 1.3 x 1.3 cm calculus. Additional right renal pelvis staghorn   calculus measuring 3.1 x 1.6 x 1 cm.  Findings compatible with   obstructing calculus with right severe pyelonephritis. No evidence of   abscess. Some imaging features raise the possibility of a chronic   process, however the typical characteristicsof xanthogranulomatous   pyelonephritis are not appreciated at this time.    Additional nonobstructing calculi within the right kidney, measuring up   to 0.7 cm.      --- End of Report ---          NA HUERTA MD; Resident Radiologist  This documenthas been electronically signed.  LISA BARKER MD; Attending Radiologist  This document has been electronically signed. Jul 25 2024  4:19PM (07-25-24 @ 15:26)      CARDIOLOGY TESTING  12 Lead ECG:   Ventricular Rate 97 BPM    Atrial Rate 97 BPM    P-R Interval 152 ms    QRS Duration 66 ms    Q-T Interval 344 ms    QTC Calculation(Bazett) 436 ms    P Axis 72 degrees    R Axis 91 degrees    T Axis 81 degrees    Diagnosis Line Normal sinus rhythm  Rightward axis  Borderline ECG    Confirmed by EDWIN DENNIS MD ([id number]) on 7/26/2024 7:15:46 AM (07-25-24 @ 20:58)      MEDICATIONS  cefTRIAXone   IVPB 2000 IV Intermittent every 24 hours  lactated ringers. 500 IV Continuous <Continuous>  pantoprazole    Tablet 40 Oral before breakfast      WEIGHT  Weight (kg): 51 (07-25-24 @ 13:43)  Creatinine: 1.3 mg/dL (07-27-24 @ 08:35)      ANTIBIOTICS:  cefTRIAXone   IVPB 2000 milliGRAM(s) IV Intermittent every 24 hours      All available historical records have been reviewed

## 2024-07-27 NOTE — PROGRESS NOTE ADULT - SUBJECTIVE AND OBJECTIVE BOX
VON CORNEJO  29y  [facility] [id number]      Patient is a 29y old  Female who presents with a chief complaint of Flank pain (26 Jul 2024 09:12)      My note supersedes the resident's note      INTERVAL HPI/OVERNIGHT EVENTS:        REVIEW OF SYSTEMS:  CONSTITUTIONAL: No fever, weight loss, or fatigue  EYES: No eye pain, visual disturbances, or discharge  ENMT:  No difficulty hearing, tinnitus, vertigo; No sinus or throat pain  NECK: No pain or stiffness  BREASTS: No pain, masses, or nipple discharge  RESPIRATORY: No cough, wheezing, chills or hemoptysis; No shortness of breath  CARDIOVASCULAR: No chest pain, palpitations, dizziness, or leg swelling  GASTROINTESTINAL: No abdominal or epigastric pain. No nausea, vomiting, or hematemesis; No diarrhea or constipation. No melena or hematochezia.  GENITOURINARY: No dysuria, frequency, hematuria, or incontinence  NEUROLOGICAL: No headaches, memory loss, loss of strength, numbness, or tremors  SKIN: No itching, burning, rashes, or lesions   LYMPH NODES: No enlarged glands  ENDOCRINE: No heat or cold intolerance; No hair loss  MUSCULOSKELETAL: No joint pain or swelling; No muscle, back, or extremity pain  PSYCHIATRIC: No depression, anxiety, mood swings, or difficulty sleeping  HEME/LYMPH: No easy bruising, or bleeding gums  ALLERY AND IMMUNOLOGIC: No hives or eczema  FAMILY HISTORY:    T(C): 36.4 (07-27-24 @ 07:30), Max: 37.1 (07-27-24 @ 06:55)  HR: 78 (07-27-24 @ 07:30) (77 - 99)  BP: 96/62 (07-27-24 @ 07:30) (96/62 - 104/71)  RR: 18 (07-27-24 @ 06:55) (18 - 19)  SpO2: 99% (07-27-24 @ 06:55) (99% - 100%)  Wt(kg): --Vital Signs Last 24 Hrs  T(C): 36.4 (27 Jul 2024 07:30), Max: 37.1 (27 Jul 2024 06:55)  T(F): 97.5 (27 Jul 2024 07:30), Max: 98.7 (27 Jul 2024 06:55)  HR: 78 (27 Jul 2024 07:30) (77 - 99)  BP: 96/62 (27 Jul 2024 07:30) (96/62 - 104/71)  BP(mean): --  RR: 18 (27 Jul 2024 06:55) (18 - 19)  SpO2: 99% (27 Jul 2024 06:55) (99% - 100%)    Parameters below as of 27 Jul 2024 06:55  Patient On (Oxygen Delivery Method): room air        PHYSICAL EXAM:  GENERAL: NAD,   HEAD:  Atraumatic, Normocephalic  EYES: EOMI, PERRLA, conjunctiva and sclera clear  ENMT: No tonsillar erythema, exudates, or enlargement; Moist mucous membranes, Good dentition, No lesions  NECK: Supple, No JVD, Normal thyroid  NERVOUS SYSTEM:  Alert & Oriented X3, Good concentration; Motor Strength 5/5 B/L upper and lower extremities; DTRs 2+ intact and symmetric  PULM: Clear to auscultation bilaterally  CARDIAC: Regular rate and rhythm; No murmurs, rubs, or gallops  GI: Soft, Nontender, Nondistended; Bowel sounds present  EXTREMITIES:  2+ Peripheral Pulses, No clubbing, cyanosis, or edema  LYMPH: No lymphadenopathy noted  SKIN: No rashes or lesions    Consultant(s) Notes Reviewed:  [x ] YES  [ ] NO  Care Discussed with Consultants/Other Providers [ x] YES  [ ] NO    LABS:                            10.1   10.75 )-----------( 425      ( 27 Jul 2024 08:35 )             32.8   07-27    139  |  103  |  19  ----------------------------<  94  4.4   |  24  |  1.3    Ca    8.3<L>      27 Jul 2024 08:35  Mg     2.5     07-27    TPro  7.4  /  Alb  3.5  /  TBili  0.4  /  DBili  x   /  AST  12  /  ALT  9   /  AlkPhos  89  07-27            Urinalysis with Rflx Culture (collected 25 Jul 2024 14:57)    Culture - Urine (collected 25 Jul 2024 14:57)  Source: Clean Catch None  Final Report (26 Jul 2024 18:01):    No growth      acetaminophen     Tablet .. 650 milliGRAM(s) Oral every 6 hours PRN  aluminum hydroxide/magnesium hydroxide/simethicone Suspension 30 milliLiter(s) Oral every 4 hours PRN  cefTRIAXone   IVPB 2000 milliGRAM(s) IV Intermittent every 24 hours  ketorolac   Injectable 15 milliGRAM(s) IV Push every 8 hours PRN  lactated ringers. 500 milliLiter(s) IV Continuous <Continuous>  melatonin 3 milliGRAM(s) Oral at bedtime PRN  ondansetron Injectable 4 milliGRAM(s) IV Push every 8 hours PRN  pantoprazole    Tablet 40 milliGRAM(s) Oral before breakfast      HEALTH ISSUES - PROBLEM Dx:          Case Discussed with House Staff   45 minutes spent on total encounter; more than 50% of the visit was spent counseling and/or coordinating care by the attending physician.    Spectra [phone]

## 2024-07-27 NOTE — PROGRESS NOTE ADULT - ASSESSMENT
ASSESSMENT  30 yo F with PMH/PSH of appendectomy non-smoker, denies daily alcohol use, presents with right flank pain for the past 2 days, persistent, radiates to the right side of her back, associated w/ fever , chills and urinary hesitancy, denies measured fever, n/v/d, anorexia, cough, respiratory sx, change in bowel habits or other urinary sx, vaginal d/c or other associated complaints at present.     IMPRESSION  #R pyelonephritis in the setting of calculi with Sepsis on admission P>90 WBC 13  On admission P>90 WBC 13    UA WBC 93 ; 7/25 UCX NG   CT A/P shows Right delayed nephrogram, marked perinephric inflammatory stranding with moderate hydronephrosis, minimal distention of renal pelvis, with right UPJ 2.2 x 1.3 x 1.3 cm calculus. Additional right renal pelvis staghorn calculus measuring 3.1 x 1.6 x 1 cm.  Findings compatible with obstructing calculus with right severe pyelonephritis. No evidence of abscess. Some imaging features raise the possibility of a chronic process, however the typical characteristics of xanthogranulomatous pyelonephritis are not appreciated at this time. Additional nonobstructing calculi within the right kidney, measuring up to 0.7 cm.  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   #Abx allergy: No Known Allergies    Creatinine: 0.8 (07-25-24 @ 14:57)      Weight (kg): 51 (07-25-24 @ 13:43)    RECOMMENDATIONS  - Ceftriaxone 2g q24h IV  - UCX NG  - Send quantiferon gold, send urine AFB   - Send HIV Ab/Ag CMIA   - Appreciate Urology consult-  - Appreciate IR consult- no indication for PCN      If any questions, please send a message or call on [employer] Teams  Please continue to update ID with any pertinent new laboratory, radiographic findings, or change in clinical status     ASSESSMENT  30 yo F with PMH/PSH of appendectomy non-smoker, denies daily alcohol use, presents with right flank pain for the past 2 days, persistent, radiates to the right side of her back, associated w/ fever , chills and urinary hesitancy, denies measured fever, n/v/d, anorexia, cough, respiratory sx, change in bowel habits or other urinary sx, vaginal d/c or other associated complaints at present.     IMPRESSION  #R pyelonephritis in the setting of calculi with Sepsis on admission P>90 WBC 13  On admission P>90 WBC 13    UA WBC 93 ; 7/25 UCX NG   CT A/P shows Right delayed nephrogram, marked perinephric inflammatory stranding with moderate hydronephrosis, minimal distention of renal pelvis, with right UPJ 2.2 x 1.3 x 1.3 cm calculus. Additional right renal pelvis staghorn calculus measuring 3.1 x 1.6 x 1 cm.  Findings compatible with obstructing calculus with right severe pyelonephritis. No evidence of abscess. Some imaging features raise the possibility of a chronic process, however the typical characteristics of xanthogranulomatous pyelonephritis are not appreciated at this time. Additional nonobstructing calculi within the right kidney, measuring up to 0.7 cm.  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   #Abx allergy: No Known Allergies    Creatinine: 0.8 (07-25-24 @ 14:57)      Weight (kg): 51 (07-25-24 @ 13:43)    RECOMMENDATIONS  - Ceftriaxone 2g q24h IV  - UCX NG  - Send quantiferon gold, send urine AFB   - Send HIV Ab/Ag CMIA   - Eosinophilia send strongy Ab  - Appreciate Urology consult-  - Appreciate IR consult- no indication for PCN      If any questions, please send a message or call on [employer] Teams  Please continue to update ID with any pertinent new laboratory, radiographic findings, or change in clinical status     ASSESSMENT  30 yo F with PMH/PSH of appendectomy non-smoker, denies daily alcohol use, presents with right flank pain for the past 2 days, persistent, radiates to the right side of her back, associated w/ fever , chills and urinary hesitancy, denies measured fever, n/v/d, anorexia, cough, respiratory sx, change in bowel habits or other urinary sx, vaginal d/c or other associated complaints at present.     IMPRESSION  #R pyelonephritis in the setting of calculi with Sepsis on admission P>90 WBC 13  On admission P>90 WBC 13    UA WBC 93 ; 7/25 UCX NG   CT A/P shows Right delayed nephrogram, marked perinephric inflammatory stranding with moderate hydronephrosis, minimal distention of renal pelvis, with right UPJ 2.2 x 1.3 x 1.3 cm calculus. Additional right renal pelvis staghorn calculus measuring 3.1 x 1.6 x 1 cm.  Findings compatible with obstructing calculus with right severe pyelonephritis. No evidence of abscess. Some imaging features raise the possibility of a chronic process, however the typical characteristics of xanthogranulomatous pyelonephritis are not appreciated at this time. Additional nonobstructing calculi within the right kidney, measuring up to 0.7 cm.  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   #Abx allergy: No Known Allergies    Creatinine: 0.8 (07-25-24 @ 14:57)      Weight (kg): 51 (07-25-24 @ 13:43)    RECOMMENDATIONS  - Ceftriaxone 2g q24h IV  - UCX NG  - Send quantiferon gold, send urine AFB   - Send HIV Ab/Ag CMIA   - CXR  - Eosinophilia send strongy Ab  - Appreciate Urology consult-  - Appreciate IR consult- no indication for PCN      If any questions, please send a message or call on [employer] Teams  Please continue to update ID with any pertinent new laboratory, radiographic findings, or change in clinical status

## 2024-07-27 NOTE — PROGRESS NOTE ADULT - ASSESSMENT
HPI:  28 yo F with PMH/PSH of appendectomy non-smoker, denies daily alcohol use, presents with right flank pain for the past 2 days, persistent, radiates to the right side of her back, associated w/ fever , chills and urinary hesitancy, denies measured fever, n/v/d, anorexia, cough, respiratory sx, change in bowel habits or other urinary sx, vaginal d/c or other associated complaints at present.     Patient reports she had a syncopal episode 1 month ago and was taken to a [facility] who had told her she had 2 Right kidney stone.She had other complaints, like nausea, weakness. Patient reports she was told to follow-up with urology; however, did not make an appointment yet. She was not prescribed abx.     3yo had toxoplasmosis tx.    # Flank pain secondary to Right renal calculi with right pyelonephritis.  ceftriaxone   after extensive conversation with both dr egan and dr wu , will continue with antibiotics until improvement in pyelonephritis, given lack of hydronephrosis no emergent procedure for now , plan for later after antibiotics likely outpatient   retroperitoneal usg to ensure no hydro developing     # Hyponatremia resolved     PROGRESS NOTE HANDOFF    Pending:  retroperitoneal usg     Family discussion: patient verbalized understanding and agreeable to plan of care     Disposition: Home

## 2024-07-28 LAB
ALBUMIN SERPL ELPH-MCNC: 4.1 G/DL — SIGNIFICANT CHANGE UP (ref 3.5–5.2)
ALP SERPL-CCNC: 83 U/L — SIGNIFICANT CHANGE UP (ref 30–115)
ALT FLD-CCNC: 12 U/L — SIGNIFICANT CHANGE UP (ref 0–41)
ANION GAP SERPL CALC-SCNC: 13 MMOL/L — SIGNIFICANT CHANGE UP (ref 7–14)
AST SERPL-CCNC: 17 U/L — SIGNIFICANT CHANGE UP (ref 0–41)
BASOPHILS # BLD AUTO: 0.05 K/UL — SIGNIFICANT CHANGE UP (ref 0–0.2)
BASOPHILS NFR BLD AUTO: 0.3 % — SIGNIFICANT CHANGE UP (ref 0–1)
BILIRUB SERPL-MCNC: 0.2 MG/DL — SIGNIFICANT CHANGE UP (ref 0.2–1.2)
BUN SERPL-MCNC: 16 MG/DL — SIGNIFICANT CHANGE UP (ref 10–20)
CALCIUM SERPL-MCNC: 9 MG/DL — SIGNIFICANT CHANGE UP (ref 8.4–10.5)
CHLORIDE SERPL-SCNC: 99 MMOL/L — SIGNIFICANT CHANGE UP (ref 98–110)
CO2 SERPL-SCNC: 24 MMOL/L — SIGNIFICANT CHANGE UP (ref 17–32)
CREAT SERPL-MCNC: 1 MG/DL — SIGNIFICANT CHANGE UP (ref 0.7–1.5)
EGFR: 79 ML/MIN/1.73M2 — SIGNIFICANT CHANGE UP
EOSINOPHIL # BLD AUTO: 1.46 K/UL — HIGH (ref 0–0.7)
EOSINOPHIL NFR BLD AUTO: 9.2 % — HIGH (ref 0–8)
GLUCOSE SERPL-MCNC: 82 MG/DL — SIGNIFICANT CHANGE UP (ref 70–99)
HCT VFR BLD CALC: 34.6 % — LOW (ref 37–47)
HGB BLD-MCNC: 11.1 G/DL — LOW (ref 12–16)
IMM GRANULOCYTES NFR BLD AUTO: 0.4 % — HIGH (ref 0.1–0.3)
LYMPHOCYTES # BLD AUTO: 1.79 K/UL — SIGNIFICANT CHANGE UP (ref 1.2–3.4)
LYMPHOCYTES # BLD AUTO: 11.3 % — LOW (ref 20.5–51.1)
MAGNESIUM SERPL-MCNC: 2.1 MG/DL — SIGNIFICANT CHANGE UP (ref 1.8–2.4)
MCHC RBC-ENTMCNC: 25.7 PG — LOW (ref 27–31)
MCHC RBC-ENTMCNC: 32.1 G/DL — SIGNIFICANT CHANGE UP (ref 32–37)
MCV RBC AUTO: 80.1 FL — LOW (ref 81–99)
MONOCYTES # BLD AUTO: 1.32 K/UL — HIGH (ref 0.1–0.6)
MONOCYTES NFR BLD AUTO: 8.3 % — SIGNIFICANT CHANGE UP (ref 1.7–9.3)
NEUTROPHILS # BLD AUTO: 11.14 K/UL — HIGH (ref 1.4–6.5)
NEUTROPHILS NFR BLD AUTO: 70.5 % — SIGNIFICANT CHANGE UP (ref 42.2–75.2)
NRBC # BLD: 0 /100 WBCS — SIGNIFICANT CHANGE UP (ref 0–0)
PHOSPHATE SERPL-MCNC: 2.8 MG/DL — SIGNIFICANT CHANGE UP (ref 2.1–4.9)
PLATELET # BLD AUTO: 402 K/UL — HIGH (ref 130–400)
PMV BLD: 8.8 FL — SIGNIFICANT CHANGE UP (ref 7.4–10.4)
POTASSIUM SERPL-MCNC: 4.5 MMOL/L — SIGNIFICANT CHANGE UP (ref 3.5–5)
POTASSIUM SERPL-SCNC: 4.5 MMOL/L — SIGNIFICANT CHANGE UP (ref 3.5–5)
PROT SERPL-MCNC: 8.5 G/DL — HIGH (ref 6–8)
RBC # BLD: 4.32 M/UL — SIGNIFICANT CHANGE UP (ref 4.2–5.4)
RBC # FLD: 15.7 % — HIGH (ref 11.5–14.5)
SODIUM SERPL-SCNC: 136 MMOL/L — SIGNIFICANT CHANGE UP (ref 135–146)
WBC # BLD: 15.83 K/UL — HIGH (ref 4.8–10.8)
WBC # FLD AUTO: 15.83 K/UL — HIGH (ref 4.8–10.8)

## 2024-07-28 PROCEDURE — 76770 US EXAM ABDO BACK WALL COMP: CPT | Mod: 26

## 2024-07-28 PROCEDURE — 99233 SBSQ HOSP IP/OBS HIGH 50: CPT

## 2024-07-28 RX ADMIN — PANTOPRAZOLE SODIUM 40 MILLIGRAM(S): 20 TABLET, DELAYED RELEASE ORAL at 05:02

## 2024-07-28 RX ADMIN — Medication 100 MILLIGRAM(S): at 06:09

## 2024-07-28 NOTE — DIETITIAN INITIAL EVALUATION ADULT - ORAL INTAKE PTA/DIET HISTORY
History obtained with Chantal ID# [id number]  Pt reports lower appetite at baseline, reports she only has about 2 meals per day for the past few months. Denies diet restrictions/cultural diets. NKFA. Denies vitamin/supplement use at home. Reports she traveled from [facility]/[place] about 10 months ago and reports wt loss since then, UBW 53 kg.

## 2024-07-28 NOTE — DIETITIAN INITIAL EVALUATION ADULT - ADD RECOMMEND
1. Continue with current diet order  ADD Ensure Clears 2x/day to optimize nutrient intake   2. Obtain food preferences to encourage PO     Pt at high risk f/u in 3-5 days or prn

## 2024-07-28 NOTE — DIETITIAN INITIAL EVALUATION ADULT - COLLABORATION WITH OTHER PROVIDERS
Intervention: meals and snacks, coordination of care  Monitoring/Evaluation: diet order, energy intake, weights, labs, GI s/s, BG, skin status, NFPE

## 2024-07-28 NOTE — DIETITIAN INITIAL EVALUATION ADULT - REASON
1700-Dr. Farr came by & took patient off the BIPAP & placed on 2 liters NC, well tolerated. Patient may eat afterwards. Sitt
No overt s/s of malnutrition

## 2024-07-28 NOTE — PROGRESS NOTE ADULT - SUBJECTIVE AND OBJECTIVE BOX
SUBJECTIVE:    Patient is a 29y old Female who presents with a chief complaint of Flank pain (26 Jul 2024 09:12)    Currently admitted to medicine with the primary diagnosis of Pyelonephritis       Today is hospital day 3d. This morning she is resting comfortably in bed and reports no new issues or overnight events.     PAST MEDICAL & SURGICAL HISTORY  No pertinent past medical history    History of appendectomy      SOCIAL HISTORY:  Negative for smoking/alcohol/drug use.     ALLERGIES:  No Known Allergies    MEDICATIONS:  STANDING MEDICATIONS  cefTRIAXone   IVPB 2000 milliGRAM(s) IV Intermittent every 24 hours  lactated ringers. 500 milliLiter(s) IV Continuous <Continuous>  pantoprazole    Tablet 40 milliGRAM(s) Oral before breakfast    PRN MEDICATIONS  acetaminophen     Tablet .. 650 milliGRAM(s) Oral every 6 hours PRN  aluminum hydroxide/magnesium hydroxide/simethicone Suspension 30 milliLiter(s) Oral every 4 hours PRN  melatonin 3 milliGRAM(s) Oral at bedtime PRN  ondansetron Injectable 4 milliGRAM(s) IV Push every 8 hours PRN    VITALS:   T(F): 99.8  HR: 108  BP: 100/66  RR: 17  SpO2: 96%    PHYSICAL EXAM:  GENERAL: No acute distress  CHEST/LUNG: CTAB; No wheezes, rales, or rhonchi  HEART: Regular rate and rhythm; Normal s1 and s2  ABDOMEN: Soft, non-tender, non-distended  EXTREMITIES:  2+ peripheral pulses b/l, No clubbing, cyanosis, or edema  NEUROLOGY: A&O x 3    LABS:                        10.1   10.75 )-----------( 425      ( 27 Jul 2024 08:35 )             32.8     07-27    139  |  103  |  19  ----------------------------<  94  4.4   |  24  |  1.3    Ca    8.3<L>      27 Jul 2024 08:35  Mg     2.5     07-27    TPro  7.4  /  Alb  3.5  /  TBili  0.4  /  DBili  x   /  AST  12  /  ALT  9   /  AlkPhos  89  07-27    PT/INR - ( 26 Jul 2024 11:04 )   PT: 13.50 sec;   INR: 1.18 ratio         PTT - ( 26 Jul 2024 11:04 )  PTT:35.4 sec  Urinalysis Basic - ( 27 Jul 2024 08:35 )    Color: x / Appearance: x / SG: x / pH: x  Gluc: 94 mg/dL / Ketone: x  / Bili: x / Urobili: x   Blood: x / Protein: x / Nitrite: x   Leuk Esterase: x / RBC: x / WBC x   Sq Epi: x / Non Sq Epi: x / Bacteria: x              Urinalysis with Rflx Culture (collected 25 Jul 2024 14:57)    Culture - Urine (collected 25 Jul 2024 14:57)  Source: Clean Catch None  Final Report (26 Jul 2024 18:01):    No growth

## 2024-07-28 NOTE — DIETITIAN INITIAL EVALUATION ADULT - PERTINENT LABORATORY DATA
07-28    136  |  99  |  16  ----------------------------<  82  4.5   |  24  |  1.0    Ca    9.0      28 Jul 2024 12:09  Phos  2.8     07-28  Mg     2.1     07-28    TPro  8.5<H>  /  Alb  4.1  /  TBili  0.2  /  DBili  x   /  AST  17  /  ALT  12  /  AlkPhos  83  07-28

## 2024-07-28 NOTE — DIETITIAN INITIAL EVALUATION ADULT - NS FNS DIET ORDER
Diet, Regular (07-27-24 @ 09:30) [Active]  Diet, NPO after Midnight:      NPO Start Date: 25-Jul-2024,   NPO Start Time: 23:59 (07-25-24 @ 20:41) [Active]

## 2024-07-28 NOTE — PROGRESS NOTE ADULT - SUBJECTIVE AND OBJECTIVE BOX
VON CORNEJO  29y  [facility]-N [id number] [id number] A      Patient is a 29y old  Female who presents with a chief complaint of Flank pain (26 Jul 2024 09:12)      My note supersedes the resident's note      INTERVAL HPI/OVERNIGHT EVENTS:      no acute events overnight   REVIEW OF SYSTEMS:  CONSTITUTIONAL: No fever, weight loss, or fatigue  EYES: No eye pain, visual disturbances, or discharge  ENMT:  No difficulty hearing, tinnitus, vertigo; No sinus or throat pain  NECK: No pain or stiffness  BREASTS: No pain, masses, or nipple discharge  RESPIRATORY: No cough, wheezing, chills or hemoptysis; No shortness of breath  CARDIOVASCULAR: No chest pain, palpitations, dizziness, or leg swelling  GASTROINTESTINAL: No abdominal or epigastric pain. No nausea, vomiting, or hematemesis; No diarrhea or constipation. No melena or hematochezia.  GENITOURINARY: No dysuria, frequency, hematuria, or incontinence  NEUROLOGICAL: No headaches, memory loss, loss of strength, numbness, or tremors  SKIN: No itching, burning, rashes, or lesions   LYMPH NODES: No enlarged glands  ENDOCRINE: No heat or cold intolerance; No hair loss  MUSCULOSKELETAL: No joint pain or swelling; No muscle, back, or extremity pain  PSYCHIATRIC: No depression, anxiety, mood swings, or difficulty sleeping  HEME/LYMPH: No easy bruising, or bleeding gums  ALLERY AND IMMUNOLOGIC: No hives or eczema  FAMILY HISTORY:    T(C): 37.7 (07-28-24 @ 07:30), Max: 37.7 (07-28-24 @ 07:30)  HR: 108 (07-28-24 @ 07:30) (87 - 108)  BP: 100/66 (07-28-24 @ 07:30) (100/66 - 105/70)  RR: 17 (07-28-24 @ 00:00) (17 - 18)  SpO2: 96% (07-28-24 @ 00:00) (95% - 96%)  Wt(kg): --Vital Signs Last 24 Hrs  T(C): 37.7 (28 Jul 2024 07:30), Max: 37.7 (28 Jul 2024 07:30)  T(F): 99.8 (28 Jul 2024 07:30), Max: 99.8 (28 Jul 2024 07:30)  HR: 108 (28 Jul 2024 07:30) (87 - 108)  BP: 100/66 (28 Jul 2024 07:30) (100/66 - 105/70)  BP(mean): --  RR: 17 (28 Jul 2024 00:00) (17 - 18)  SpO2: 96% (28 Jul 2024 00:00) (95% - 96%)    Parameters below as of 28 Jul 2024 00:00  Patient On (Oxygen Delivery Method): room air        PHYSICAL EXAM:  GENERAL: NAD,   HEAD:  Atraumatic, Normocephalic  EYES: EOMI, PERRLA, conjunctiva and sclera clear  ENMT: No tonsillar erythema, exudates, or enlargement; Moist mucous membranes, Good dentition, No lesions  NECK: Supple, No JVD, Normal thyroid  NERVOUS SYSTEM:  Alert & Oriented X3, Good concentration; Motor Strength 5/5 B/L upper and lower extremities; DTRs 2+ intact and symmetric  PULM: Clear to auscultation bilaterally  CARDIAC: Regular rate and rhythm; No murmurs, rubs, or gallops  GI: Soft,dull tenderness around pelvic area   EXTREMITIES:  2+ Peripheral Pulses, No clubbing, cyanosis, or edema  LYMPH: No lymphadenopathy noted  SKIN: No rashes or lesions    Consultant(s) Notes Reviewed:  [x ] YES  [ ] NO  Care Discussed with Consultants/Other Providers [ x] YES  [ ] NO    LABS:                            10.1   10.75 )-----------( 425      ( 27 Jul 2024 08:35 )             32.8   07-27    139  |  103  |  19  ----------------------------<  94  4.4   |  24  |  1.3    Ca    8.3<L>      27 Jul 2024 08:35  Mg     2.5     07-27    TPro  7.4  /  Alb  3.5  /  TBili  0.4  /  DBili  x   /  AST  12  /  ALT  9   /  AlkPhos  89  07-27            Urinalysis with Rflx Culture (collected 25 Jul 2024 14:57)    Culture - Urine (collected 25 Jul 2024 14:57)  Source: Clean Catch None  Final Report (26 Jul 2024 18:01):    No growth      acetaminophen     Tablet .. 650 milliGRAM(s) Oral every 6 hours PRN  aluminum hydroxide/magnesium hydroxide/simethicone Suspension 30 milliLiter(s) Oral every 4 hours PRN  cefTRIAXone   IVPB 2000 milliGRAM(s) IV Intermittent every 24 hours  lactated ringers. 500 milliLiter(s) IV Continuous <Continuous>  melatonin 3 milliGRAM(s) Oral at bedtime PRN  ondansetron Injectable 4 milliGRAM(s) IV Push every 8 hours PRN  pantoprazole    Tablet 40 milliGRAM(s) Oral before breakfast      HEALTH ISSUES - PROBLEM Dx:          Case Discussed with House Staff   45 minutes spent on total encounter; more than 50% of the visit was spent counseling and/or coordinating care by the attending physician.    Spectra [phone]

## 2024-07-28 NOTE — DIETITIAN INITIAL EVALUATION ADULT - OTHER CALCULATIONS
ENERGY: MSJ x 1.2-1.3 AF: 1446 - 1566 kcal/day  FLUIDS: 1 mL/kcal/day  Estimated needs in consideration with age, BMI

## 2024-07-28 NOTE — DIETITIAN INITIAL EVALUATION ADULT - EDUCATION DIETARY MODIFICATIONS
Encouraged PO intake, discussed oral nutrition supplements - pt willing to try./(1) partially meets; needs review/practice/verbalization

## 2024-07-28 NOTE — PROGRESS NOTE ADULT - ASSESSMENT
28 yo F with PMH/PSH of appendectomy non-smoker, denies daily alcohol use, presents with right flank pain for the past 2 days.     #right flank pain  #pyelonephritis  #kidney stones  #leukocytosis  -Labs : wbc 13.43 , hg 11.7 , plt 437 ,na 133, k4.3, crea 0.8   -Ua positive WBC93, RBC0, occ bacteria, moderate LE, neg nitrite   -CT A/P shows Right delayed nephrogram, marked perinephric inflammatory stranding with moderate hydronephrosis, minimal distention of renal pelvis, with right UPJ 2.2 x 1.3 x 1.3 cm calculus. Additional right renal pelvis staghorn calculus measuring 3.1 x 1.6 x 1 cm.  Findings compatible with obstructing calculus with right severe pyelonephritis. No evidence of abscess. Some imaging features raise the possibility of a chronic process, however the typical characteristics of xanthogranulomatous pyelonephritis are not appreciated at this time. Additional nonobstructing calculi within the right kidney, measuring up to 0.7 cm.  -Zofran IV push and 2g Rocephin IVPB, 15mg of Toradol IV push, IVF   -Patient will need to f/u outpatient with urology for definitive stone management once infection is resolved.   -Urine culture: no growth  -if fever and chills take blood cultures   -IR: patient not a candidate for PCN due to inflammation  -Urology: PCNU placement will be done by IR prior to PCNL scheduled with Dr. Carter as outpatient. Continue current maangement with IV antibiotics, bladder scan, f/u urine cultures and ID recs   -Continue IV abx - Rocephin   -ID: ceftriaxone 2g q24 IV   -Continue IVF, LR 100ml per hour for 12 hours  -Analgesics for pain control prn toradol and tylenol  -Pending retroperitoneal us to ensure no hydro developing     #anemia  -mcv 81   -OP f/u     Pending: retroperitoneal ultrasound

## 2024-07-28 NOTE — PROGRESS NOTE ADULT - ASSESSMENT
HPI:  30 yo F with PMH/PSH of appendectomy non-smoker, denies daily alcohol use, presents with right flank pain for the past 2 days, persistent, radiates to the right side of her back, associated w/ fever , chills and urinary hesitancy, denies measured fever, n/v/d, anorexia, cough, respiratory sx, change in bowel habits or other urinary sx, vaginal d/c or other associated complaints at present.     Patient reports she had a syncopal episode 1 month ago and was taken to a [facility] who had told her she had 2 Right kidney stone.She had other complaints, like nausea, weakness. Patient reports she was told to follow-up with urology; however, did not make an appointment yet. She was not prescribed abx.     3yo had toxoplasmosis tx.    # Flank pain secondary to Right renal calculi with right pyelonephritis.  ceftriaxone   retroperitoneal usg shows renal fullness  hiv , quantiferon sent     # Hyponatremia resolved     PROGRESS NOTE HANDOFF    Pending:   infectious workup , anticipate dc within 24 hours     Family discussion: patient verbalized understanding and agreeable to plan of care     Disposition: Home

## 2024-07-28 NOTE — DIETITIAN INITIAL EVALUATION ADULT - OTHER INFO
30 yo F with PMH/PSH of appendectomy non-smoker, denies daily alcohol use, presents with right flank pain for the past 2 days, persistent, radiates to the right side of her back, associated w/ fever , chills and urinary hesitancy.    # Flank pain secondary to Right renal calculi with right pyelonephritis

## 2024-07-28 NOTE — DIETITIAN INITIAL EVALUATION ADULT - ENERGY INTAKE
Pt reports low appetite on admission due to dislike of vegetables and pain. Pt reports she felt nausea after her ultrasound this morning and could not eat breakfast.  Fair (50-75%)

## 2024-07-28 NOTE — DIETITIAN INITIAL EVALUATION ADULT - PERTINENT MEDS FT
MEDICATIONS  (STANDING):  cefTRIAXone   IVPB 2000 milliGRAM(s) IV Intermittent every 24 hours  lactated ringers. 500 milliLiter(s) (100 mL/Hr) IV Continuous <Continuous>  pantoprazole    Tablet 40 milliGRAM(s) Oral before breakfast    MEDICATIONS  (PRN):  acetaminophen     Tablet .. 650 milliGRAM(s) Oral every 6 hours PRN Temp greater or equal to 38C (100.4F), Mild Pain (1 - 3)  aluminum hydroxide/magnesium hydroxide/simethicone Suspension 30 milliLiter(s) Oral every 4 hours PRN Dyspepsia  melatonin 3 milliGRAM(s) Oral at bedtime PRN Insomnia  ondansetron Injectable 4 milliGRAM(s) IV Push every 8 hours PRN Nausea and/or Vomiting

## 2024-07-29 LAB
ALBUMIN SERPL ELPH-MCNC: 4.3 G/DL — SIGNIFICANT CHANGE UP (ref 3.5–5.2)
ALP SERPL-CCNC: 89 U/L — SIGNIFICANT CHANGE UP (ref 30–115)
ALT FLD-CCNC: 14 U/L — SIGNIFICANT CHANGE UP (ref 0–41)
ANION GAP SERPL CALC-SCNC: 13 MMOL/L — SIGNIFICANT CHANGE UP (ref 7–14)
AST SERPL-CCNC: 18 U/L — SIGNIFICANT CHANGE UP (ref 0–41)
BASOPHILS # BLD AUTO: 0.06 K/UL — SIGNIFICANT CHANGE UP (ref 0–0.2)
BASOPHILS NFR BLD AUTO: 0.4 % — SIGNIFICANT CHANGE UP (ref 0–1)
BILIRUB SERPL-MCNC: 0.4 MG/DL — SIGNIFICANT CHANGE UP (ref 0.2–1.2)
BUN SERPL-MCNC: 15 MG/DL — SIGNIFICANT CHANGE UP (ref 10–20)
CALCIUM SERPL-MCNC: 9.2 MG/DL — SIGNIFICANT CHANGE UP (ref 8.4–10.5)
CHLORIDE SERPL-SCNC: 99 MMOL/L — SIGNIFICANT CHANGE UP (ref 98–110)
CO2 SERPL-SCNC: 25 MMOL/L — SIGNIFICANT CHANGE UP (ref 17–32)
CREAT SERPL-MCNC: 1 MG/DL — SIGNIFICANT CHANGE UP (ref 0.7–1.5)
EGFR: 79 ML/MIN/1.73M2 — SIGNIFICANT CHANGE UP
EOSINOPHIL # BLD AUTO: 1.64 K/UL — HIGH (ref 0–0.7)
EOSINOPHIL NFR BLD AUTO: 12 % — HIGH (ref 0–8)
GLUCOSE SERPL-MCNC: 87 MG/DL — SIGNIFICANT CHANGE UP (ref 70–99)
HCT VFR BLD CALC: 38.2 % — SIGNIFICANT CHANGE UP (ref 37–47)
HGB BLD-MCNC: 12 G/DL — SIGNIFICANT CHANGE UP (ref 12–16)
HIV 1+2 AB+HIV1 P24 AG SERPL QL IA: SIGNIFICANT CHANGE UP
IMM GRANULOCYTES NFR BLD AUTO: 0.3 % — SIGNIFICANT CHANGE UP (ref 0.1–0.3)
LYMPHOCYTES # BLD AUTO: 1.83 K/UL — SIGNIFICANT CHANGE UP (ref 1.2–3.4)
LYMPHOCYTES # BLD AUTO: 13.4 % — LOW (ref 20.5–51.1)
MAGNESIUM SERPL-MCNC: 2.2 MG/DL — SIGNIFICANT CHANGE UP (ref 1.8–2.4)
MCHC RBC-ENTMCNC: 25.4 PG — LOW (ref 27–31)
MCHC RBC-ENTMCNC: 31.4 G/DL — LOW (ref 32–37)
MCV RBC AUTO: 80.9 FL — LOW (ref 81–99)
MONOCYTES # BLD AUTO: 1.15 K/UL — HIGH (ref 0.1–0.6)
MONOCYTES NFR BLD AUTO: 8.4 % — SIGNIFICANT CHANGE UP (ref 1.7–9.3)
NEUTROPHILS # BLD AUTO: 8.95 K/UL — HIGH (ref 1.4–6.5)
NEUTROPHILS NFR BLD AUTO: 65.5 % — SIGNIFICANT CHANGE UP (ref 42.2–75.2)
NIGHT BLUE STAIN TISS: SIGNIFICANT CHANGE UP
NRBC # BLD: 0 /100 WBCS — SIGNIFICANT CHANGE UP (ref 0–0)
PHOSPHATE SERPL-MCNC: 4.2 MG/DL — SIGNIFICANT CHANGE UP (ref 2.1–4.9)
PLATELET # BLD AUTO: 461 K/UL — HIGH (ref 130–400)
PMV BLD: 8.8 FL — SIGNIFICANT CHANGE UP (ref 7.4–10.4)
POTASSIUM SERPL-MCNC: 4.2 MMOL/L — SIGNIFICANT CHANGE UP (ref 3.5–5)
POTASSIUM SERPL-SCNC: 4.2 MMOL/L — SIGNIFICANT CHANGE UP (ref 3.5–5)
PROT SERPL-MCNC: 8.9 G/DL — HIGH (ref 6–8)
RBC # BLD: 4.72 M/UL — SIGNIFICANT CHANGE UP (ref 4.2–5.4)
RBC # FLD: 15.9 % — HIGH (ref 11.5–14.5)
SODIUM SERPL-SCNC: 137 MMOL/L — SIGNIFICANT CHANGE UP (ref 135–146)
SPECIMEN SOURCE: SIGNIFICANT CHANGE UP
WBC # BLD: 13.67 K/UL — HIGH (ref 4.8–10.8)
WBC # FLD AUTO: 13.67 K/UL — HIGH (ref 4.8–10.8)

## 2024-07-29 PROCEDURE — 99233 SBSQ HOSP IP/OBS HIGH 50: CPT

## 2024-07-29 RX ORDER — SENNOSIDES 8.6 MG/1
2 TABLET ORAL AT BEDTIME
Refills: 0 | Status: DISCONTINUED | OUTPATIENT
Start: 2024-07-29 | End: 2024-08-02

## 2024-07-29 RX ORDER — LORATADINE 10 MG
17 TABLET,DISINTEGRATING ORAL
Refills: 0 | Status: DISCONTINUED | OUTPATIENT
Start: 2024-07-29 | End: 2024-08-02

## 2024-07-29 RX ADMIN — Medication 17 GRAM(S): at 21:12

## 2024-07-29 RX ADMIN — Medication 17 GRAM(S): at 11:43

## 2024-07-29 RX ADMIN — SENNOSIDES 2 TABLET(S): 8.6 TABLET ORAL at 21:12

## 2024-07-29 RX ADMIN — PANTOPRAZOLE SODIUM 40 MILLIGRAM(S): 20 TABLET, DELAYED RELEASE ORAL at 05:48

## 2024-07-29 RX ADMIN — SENNOSIDES 2 TABLET(S): 8.6 TABLET ORAL at 11:43

## 2024-07-29 RX ADMIN — Medication 100 MILLIGRAM(S): at 05:48

## 2024-07-29 NOTE — PROGRESS NOTE ADULT - ASSESSMENT
28 yo F with PMH/PSH of appendectomy non-smoker, denies daily alcohol use, presents with right flank pain for the past 2 days.     #right flank pain  #pyelonephritis  #kidney stones  #leukocytosis  -Labs : wbc 13.43 , hg 11.7 , plt 437 ,na 133, k4.3, crea 0.8   -Ua positive WBC93, RBC0, occ bacteria, moderate LE, neg nitrite   -CT A/P shows Right delayed nephrogram, marked perinephric inflammatory stranding with moderate hydronephrosis, minimal distention of renal pelvis, with right UPJ 2.2 x 1.3 x 1.3 cm calculus. Additional right renal pelvis staghorn calculus measuring 3.1 x 1.6 x 1 cm.  Findings compatible with obstructing calculus with right severe pyelonephritis. No evidence of abscess. Some imaging features raise the possibility of a chronic process, however the typical characteristics of xanthogranulomatous pyelonephritis are not appreciated at this time. Additional nonobstructing calculi within the right kidney, measuring up to 0.7 cm.  -Zofran IV push and 2g Rocephin IVPB, 15mg of Toradol IV push, IVF   -Patient will need to f/u outpatient with urology for definitive stone management once infection is resolved.   -Urine culture: no growth  -if fever and chills take blood cultures   -IR: patient not a candidate for PCN due to inflammation  -Urology: PCNU placement will be done by IR prior to PCNL scheduled with Dr. Carter as outpatient. Continue current maangement with IV antibiotics, bladder scan, f/u urine cultures and ID recs   -ceftriaxone 2g q24 IV as per ID  -Continue IVF, LR 100ml per hour for 12 hours  -Analgesics for pain control prn toradol and tylenol  -Pending retroperitoneal us to ensure no hydro developing     #anemia  -mcv 81   -OP f/u     Pending: retroperitoneal ultrasound  28 yo F with PMH/PSH of appendectomy non-smoker, denies daily alcohol use, presents with right flank pain for the past 2 days.     #right flank pain  #pyelonephritis  #kidney stones  #leukocytosis  -Labs : wbc 13.43 , hg 11.7 , plt 437 ,na 133, k4.3, crea 0.8   -Ua positive WBC93, RBC0, occ bacteria, moderate LE, neg nitrite   -CT A/P shows Right delayed nephrogram, marked perinephric inflammatory stranding with moderate hydronephrosis, minimal distention of renal pelvis, with right UPJ 2.2 x 1.3 x 1.3 cm calculus. Additional right renal pelvis staghorn calculus measuring 3.1 x 1.6 x 1 cm.  Findings compatible with obstructing calculus with right severe pyelonephritis. No evidence of abscess. Some imaging features raise the possibility of a chronic process, however the typical characteristics of xanthogranulomatous pyelonephritis are not appreciated at this time. Additional nonobstructing calculi within the right kidney, measuring up to 0.7 cm.  -Zofran IV push and 2g Rocephin IVPB, 15mg of Toradol IV push, tylenol  -Patient will need to f/u outpatient with urology for definitive stone management once infection is resolved.   -Urine culture: no growth  -if fever and chills take blood cultures   -IR: patient not a candidate for PCN due to inflammation  -Urology: PCNU placement will be done by IR prior to PCNL scheduled with Dr. Carter as outpatient. Continue current maangement with IV antibiotics, bladder scan, f/u urine cultures and ID recs   -ceftriaxone 2g q24 IV as per ID  -retroperitoneal US shows fullness of the right renal collecting system but not hydronephrosis  -transvaginal US- cervical cysts    #anemia  -mcv 81   -OP f/u       #DVT proph- none  #Diet- normal  Pending: clinical improvement, possible intervention from uro or IR 28 yo F with PMH/PSH of appendectomy non-smoker, denies daily alcohol use, presents with right flank pain for the past 2 days.     #right flank pain  #pyelonephritis  #kidney stones  #leukocytosis  -Labs : wbc 13.43 , hg 11.7 , plt 437 ,na 133, k4.3, crea 0.8   -Ua positive WBC93, RBC0, occ bacteria, moderate LE, neg nitrite   -CT A/P shows Right delayed nephrogram, marked perinephric inflammatory stranding with moderate hydronephrosis, minimal distention of renal pelvis, with right UPJ 2.2 x 1.3 x 1.3 cm calculus. Additional right renal pelvis staghorn calculus measuring 3.1 x 1.6 x 1 cm.  Findings compatible with obstructing calculus with right severe pyelonephritis. No evidence of abscess. Some imaging features raise the possibility of a chronic process, however the typical characteristics of xanthogranulomatous pyelonephritis are not appreciated at this time. Additional nonobstructing calculi within the right kidney, measuring up to 0.7 cm.  -Zofran IV push and 2g Rocephin IVPB, 15mg of Toradol IV push, tylenol  -Patient will need to f/u outpatient with urology for definitive stone management once infection is resolved.   -Urine culture: no growth  -if fever and chills take blood cultures   -IR: patient not a candidate for PCN due to inflammation  -Urology: PCNU placement will be done by IR prior to PCNL scheduled with Dr. Carter as outpatient. Continue current maangement with IV antibiotics, bladder scan, f/u urine cultures and ID recs   -ceftriaxone 2g q24 IV as per ID  -retroperitoneal US shows fullness of the right renal collecting system but not hydronephrosis  -transvaginal US- cervical cysts  -urine shows no growth of acid fast bacilli     #anemia  -mcv 81   -OP f/u       #DVT proph- none  #Diet- normal  Pending: clinical improvement, possible intervention from uro or IR

## 2024-07-29 NOTE — PROGRESS NOTE ADULT - SUBJECTIVE AND OBJECTIVE BOX
SUBJECTIVE/OVERNIGHT EVENTS  Today is hospital day 4d. This morning patient was seen and examined at bedside, resting comfortably in bed. No acute or major events overnight.    HOSPITAL COURSE  Day 1:   Day 2:   Day 3:     CODE STATUS:    FAMILY COMMUNICATION  Contact date:  Name of person contacted:  Relationship to patient:  Communication details:    MEDICATIONS  STANDING MEDICATIONS  cefTRIAXone   IVPB 2000 milliGRAM(s) IV Intermittent every 24 hours  lactated ringers. 500 milliLiter(s) IV Continuous <Continuous>  pantoprazole    Tablet 40 milliGRAM(s) Oral before breakfast    PRN MEDICATIONS  acetaminophen     Tablet .. 650 milliGRAM(s) Oral every 6 hours PRN  aluminum hydroxide/magnesium hydroxide/simethicone Suspension 30 milliLiter(s) Oral every 4 hours PRN  melatonin 3 milliGRAM(s) Oral at bedtime PRN  ondansetron Injectable 4 milliGRAM(s) IV Push every 8 hours PRN    VITALS  T(F): 99.6 (07-29-24 @ 00:00), Max: 99.8 (07-28-24 @ 07:30)  HR: 105 (07-29-24 @ 00:00) (101 - 105)  BP: 100/68 (07-29-24 @ 00:00) (95/64 - 100/68)  RR: 18 (07-29-24 @ 00:00) (18 - 18)  SpO2: --    PHYSICAL EXAM  GENERAL  (  ) NAD, lying in bed comfortably     (  ) obtunded     (  ) lethargic     (  ) somnolent    HEAD  (  ) Atraumatic     (  ) hematoma     (  ) laceration (specify location:       )     NECK  (  ) Supple     (  ) neck stiffness     (  ) nuchal rigidity     (  )  no JVD     (  ) JVD present ( -- cm)    HEART  Rate -->  (  ) normal rate    (  ) bradycardic    (  ) tachycardic  Rhythm -->  (  ) regular    (  ) regularly irregular    (  ) irregularly irregular  Murmurs -->  (  ) normal s1/s2    (  ) systolic murmur    (  ) diastolic murmur    (  ) continuous murmur     (  ) S3 present    (  ) S4 present    LUNGS  (  )Unlabored respirations     (  ) tachypnea  (  ) B/L air entry     (  ) decreased breath sounds in:  (location     )    (  ) no adventitious sound     (  ) crackles     (  ) wheezing      (  ) rhonchi      (specify location:       )  (  ) chest wall tenderness (specify location:       )    ABDOMEN  (  ) Soft     (  ) tense   |   (  ) nondistended     (  ) distended   |   (  ) +BS     (  ) hypoactive bowel sounds     (  ) hyperactive bowel sounds  (  ) nontender     (  ) RUQ tenderness     (  ) RLQ tenderness     (  ) LLQ tenderness     (  ) epigastric tenderness     (  ) diffuse tenderness  (  ) Splenomegaly      (  ) Hepatomegaly      (  ) Jaundice     (  ) ecchymosis     EXTREMITIES  (  ) Normal     (  ) Rash     (  ) ecchymosis     (  ) varicose veins      (  ) pitting edema     (  ) non-pitting edema   (  ) ulceration     (  ) gangrene:     (location:     )    NERVOUS SYSTEM  (  ) A&Ox3     (  ) confused     (  ) lethargic  CN II-XII:     (  ) Intact     (  ) focal deficits  (Specify:     )   Upper extremities:     (  ) strength X/5     (  ) focal deficit (specify:    )  Lower extremities:     (  ) strength  X/5    (  ) focal deficit (specify:    )    SKIN  (  ) No rashes or lesions     (  ) maculopapular rash     (  ) pustules     (  ) vesicles     (  ) ulcer     (  ) ecchymosis     (specify location:     )    (  ) Indwelling Parker Catheter   Date insterted:    Reason (  ) Critical illness     (  ) urinary retention    (  ) Accurate Ins/Outs Monitoring     (  ) CMO patient    (  ) Central Line  Date inserted:  Location: (  ) Right IJ   (  ) Left IJ   (  ) Right Fem   (  ) Left Fem    (  ) SPC  (  ) pigtail  (  ) PEG tube  (  ) colostomy  (  ) jejunostomy  (  ) U-Dall    LABS             11.1   15.83 )-----------( 402      ( 07-28-24 @ 12:09 )             34.6     136  |  99  |  16  -------------------------<  82   07-28-24 @ 12:09  4.5  |  24  |  1.0    Ca      9.0     07-28-24 @ 12:09  Phos   2.8     07-28-24 @ 12:09  Mg     2.1     07-28-24 @ 12:09    TPro  8.5  /  Alb  4.1  /  TBili  0.2  /  DBili  x   /  AST  17  /  ALT  12  /  AlkPhos  83  /  GGT  x     07-28-24 @ 12:09        Urinalysis Basic - ( 28 Jul 2024 12:09 )    Color: x / Appearance: x / SG: x / pH: x  Gluc: 82 mg/dL / Ketone: x  / Bili: x / Urobili: x   Blood: x / Protein: x / Nitrite: x   Leuk Esterase: x / RBC: x / WBC x   Sq Epi: x / Non Sq Epi: x / Bacteria: x          IMAGING SUBJECTIVE/OVERNIGHT EVENTS  Today is hospital day 4d. This morning patient was seen and examined at bedside, resting comfortably in bed. No acute or major events overnight. Pt continues to c/o lower abdominal and back pain    MEDICATIONS  STANDING MEDICATIONS  cefTRIAXone   IVPB 2000 milliGRAM(s) IV Intermittent every 24 hours  lactated ringers. 500 milliLiter(s) IV Continuous <Continuous>  pantoprazole    Tablet 40 milliGRAM(s) Oral before breakfast    PRN MEDICATIONS  acetaminophen     Tablet .. 650 milliGRAM(s) Oral every 6 hours PRN  aluminum hydroxide/magnesium hydroxide/simethicone Suspension 30 milliLiter(s) Oral every 4 hours PRN  melatonin 3 milliGRAM(s) Oral at bedtime PRN  ondansetron Injectable 4 milliGRAM(s) IV Push every 8 hours PRN    VITALS  T(F): 99.6 (07-29-24 @ 00:00), Max: 99.8 (07-28-24 @ 07:30)  HR: 105 (07-29-24 @ 00:00) (101 - 105)  BP: 100/68 (07-29-24 @ 00:00) (95/64 - 100/68)  RR: 18 (07-29-24 @ 00:00) (18 - 18)  SpO2: --    PHYSICAL EXAM  GENERAL  NAD, lying in bed comfortably    ABDOMEN  soft nTND      CVA tenderness on the left side      LABS             11.1   15.83 )-----------( 402      ( 07-28-24 @ 12:09 )             34.6     136  |  99  |  16  -------------------------<  82   07-28-24 @ 12:09  4.5  |  24  |  1.0    Ca      9.0     07-28-24 @ 12:09  Phos   2.8     07-28-24 @ 12:09  Mg     2.1     07-28-24 @ 12:09    TPro  8.5  /  Alb  4.1  /  TBili  0.2  /  DBili  x   /  AST  17  /  ALT  12  /  AlkPhos  83  /  GGT  x     07-28-24 @ 12:09        Urinalysis Basic - ( 28 Jul 2024 12:09 )    Color: x / Appearance: x / SG: x / pH: x  Gluc: 82 mg/dL / Ketone: x  / Bili: x / Urobili: x   Blood: x / Protein: x / Nitrite: x   Leuk Esterase: x / RBC: x / WBC x   Sq Epi: x / Non Sq Epi: x / Bacteria: x          IMAGING

## 2024-07-29 NOTE — PROGRESS NOTE ADULT - SUBJECTIVE AND OBJECTIVE BOX
VON CORNEJO  29y  [facility] 4B 022 A      Patient is a 29y old  Female who presents with a chief complaint of Urinary tract infection     (28 Jul 2024 13:49)      My note supersedes the resident's note      INTERVAL HPI/OVERNIGHT EVENTS:  no acute overnight events       REVIEW OF SYSTEMS:  CONSTITUTIONAL: No fever, weight loss, or fatigue  EYES: No eye pain, visual disturbances, or discharge  ENMT:  No difficulty hearing, tinnitus, vertigo; No sinus or throat pain  NECK: No pain or stiffness  BREASTS: No pain, masses, or nipple discharge  RESPIRATORY: No cough, wheezing, chills or hemoptysis; No shortness of breath  CARDIOVASCULAR: No chest pain, palpitations, dizziness, or leg swelling  GASTROINTESTINAL:+ constipation   GENITOURINARY: No dysuria, frequency, hematuria, or incontinence  NEUROLOGICAL: No headaches, memory loss, loss of strength, numbness, or tremors  SKIN: No itching, burning, rashes, or lesions   LYMPH NODES: No enlarged glands  ENDOCRINE: No heat or cold intolerance; No hair loss  MUSCULOSKELETAL: No joint pain or swelling; No muscle, back, or extremity pain  PSYCHIATRIC: No depression, anxiety, mood swings, or difficulty sleeping  HEME/LYMPH: No easy bruising, or bleeding gums  ALLERY AND IMMUNOLOGIC: No hives or eczema  FAMILY HISTORY:    T(C): 36.9 (07-29-24 @ 07:00), Max: 37.6 (07-29-24 @ 00:00)  HR: 98 (07-29-24 @ 07:00) (98 - 105)  BP: 104/68 (07-29-24 @ 07:00) (95/64 - 104/68)  RR: 18 (07-29-24 @ 07:00) (18 - 18)  SpO2: 94% (07-29-24 @ 07:00) (94% - 94%)  Wt(kg): --Vital Signs Last 24 Hrs  T(C): 36.9 (29 Jul 2024 07:00), Max: 37.6 (29 Jul 2024 00:00)  T(F): 98.5 (29 Jul 2024 07:00), Max: 99.6 (29 Jul 2024 00:00)  HR: 98 (29 Jul 2024 07:00) (98 - 105)  BP: 104/68 (29 Jul 2024 07:00) (95/64 - 104/68)  BP(mean): --  RR: 18 (29 Jul 2024 07:00) (18 - 18)  SpO2: 94% (29 Jul 2024 07:00) (94% - 94%)    Parameters below as of 29 Jul 2024 07:00  Patient On (Oxygen Delivery Method): room air        PHYSICAL EXAM:  GENERAL: NAD,   HEAD:  Atraumatic, Normocephalic  EYES: EOMI, PERRLA, conjunctiva and sclera clear  ENMT: No tonsillar erythema, exudates, or enlargement; Moist mucous membranes, Good dentition, No lesions  NECK: Supple, No JVD, Normal thyroid  NERVOUS SYSTEM:  Alert & Oriented X3, Good concentration; Motor Strength 5/5 B/L upper and lower extremities; DTRs 2+ intact and symmetric  PULM: Clear to auscultation bilaterally  CARDIAC: Regular rate and rhythm; No murmurs, rubs, or gallops  GI: Soft, Nontender, Nondistended; Bowel sounds present  EXTREMITIES:  2+ Peripheral Pulses, No clubbing, cyanosis, or edema  LYMPH: No lymphadenopathy noted  SKIN: No rashes or lesions    Consultant(s) Notes Reviewed:  [x ] YES  [ ] NO  Care Discussed with Consultants/Other Providers [ x] YES  [ ] NO    LABS:                            12.0   13.67 )-----------( 461      ( 29 Jul 2024 09:10 )             38.2   07-29    137  |  99  |  15  ----------------------------<  87  4.2   |  25  |  1.0    Ca    9.2      29 Jul 2024 09:10  Phos  4.2     07-29  Mg     2.2     07-29    TPro  8.9<H>  /  Alb  4.3  /  TBili  0.4  /  DBili  x   /  AST  18  /  ALT  14  /  AlkPhos  89  07-29            acetaminophen     Tablet .. 650 milliGRAM(s) Oral every 6 hours PRN  aluminum hydroxide/magnesium hydroxide/simethicone Suspension 30 milliLiter(s) Oral every 4 hours PRN  cefTRIAXone   IVPB 2000 milliGRAM(s) IV Intermittent every 24 hours  melatonin 3 milliGRAM(s) Oral at bedtime PRN  ondansetron Injectable 4 milliGRAM(s) IV Push every 8 hours PRN  pantoprazole    Tablet 40 milliGRAM(s) Oral before breakfast  polyethylene glycol 3350 17 Gram(s) Oral two times a day  senna 2 Tablet(s) Oral at bedtime      HEALTH ISSUES - PROBLEM Dx:          Case Discussed with House Staff   45 minutes spent on total encounter; more than 50% of the visit was spent counseling and/or coordinating care by the attending physician.    Spectra [phone]

## 2024-07-29 NOTE — PROGRESS NOTE ADULT - ASSESSMENT
HPI:  28 yo F with PMH/PSH of appendectomy non-smoker, denies daily alcohol use, presents with right flank pain for the past 2 days, persistent, radiates to the right side of her back, associated w/ fever , chills and urinary hesitancy, denies measured fever, n/v/d, anorexia, cough, respiratory sx, change in bowel habits or other urinary sx, vaginal d/c or other associated complaints at present.     Patient reports she had a syncopal episode 1 month ago and was taken to a [facility] who had told her she had 2 Right kidney stone.She had other complaints, like nausea, weakness. Patient reports she was told to follow-up with urology; however, did not make an appointment yet. She was not prescribed abx.     3yo had toxoplasmosis tx.    # Flank pain secondary to Right renal calculi with right pyelonephritis.  ceftriaxone   hiv , quantiferon sent   spoke with urology at length outpatient follow up    urine acid fast pending     #Constipation miralax       PROGRESS NOTE HANDOFF    Pending:   infectious workup , bowel movement     Family discussion: patient verbalized understanding and agreeable to plan of care     Disposition: Home

## 2024-07-30 LAB
ALBUMIN SERPL ELPH-MCNC: 4.1 G/DL — SIGNIFICANT CHANGE UP (ref 3.5–5.2)
ALP SERPL-CCNC: 94 U/L — SIGNIFICANT CHANGE UP (ref 30–115)
ALT FLD-CCNC: 18 U/L — SIGNIFICANT CHANGE UP (ref 0–41)
ANION GAP SERPL CALC-SCNC: 12 MMOL/L — SIGNIFICANT CHANGE UP (ref 7–14)
AST SERPL-CCNC: 20 U/L — SIGNIFICANT CHANGE UP (ref 0–41)
BASOPHILS # BLD AUTO: 0.06 K/UL — SIGNIFICANT CHANGE UP (ref 0–0.2)
BASOPHILS NFR BLD AUTO: 0.5 % — SIGNIFICANT CHANGE UP (ref 0–1)
BILIRUB SERPL-MCNC: 0.3 MG/DL — SIGNIFICANT CHANGE UP (ref 0.2–1.2)
BUN SERPL-MCNC: 15 MG/DL — SIGNIFICANT CHANGE UP (ref 10–20)
CALCIUM SERPL-MCNC: 8.9 MG/DL — SIGNIFICANT CHANGE UP (ref 8.4–10.5)
CHLORIDE SERPL-SCNC: 99 MMOL/L — SIGNIFICANT CHANGE UP (ref 98–110)
CO2 SERPL-SCNC: 26 MMOL/L — SIGNIFICANT CHANGE UP (ref 17–32)
CREAT SERPL-MCNC: 0.9 MG/DL — SIGNIFICANT CHANGE UP (ref 0.7–1.5)
EGFR: 89 ML/MIN/1.73M2 — SIGNIFICANT CHANGE UP
EOSINOPHIL # BLD AUTO: 1.86 K/UL — HIGH (ref 0–0.7)
EOSINOPHIL NFR BLD AUTO: 16.7 % — HIGH (ref 0–8)
GLUCOSE SERPL-MCNC: 96 MG/DL — SIGNIFICANT CHANGE UP (ref 70–99)
HCT VFR BLD CALC: 36 % — LOW (ref 37–47)
HGB BLD-MCNC: 11.1 G/DL — LOW (ref 12–16)
IMM GRANULOCYTES NFR BLD AUTO: 0.4 % — HIGH (ref 0.1–0.3)
LYMPHOCYTES # BLD AUTO: 1.67 K/UL — SIGNIFICANT CHANGE UP (ref 1.2–3.4)
LYMPHOCYTES # BLD AUTO: 15 % — LOW (ref 20.5–51.1)
MAGNESIUM SERPL-MCNC: 2.2 MG/DL — SIGNIFICANT CHANGE UP (ref 1.8–2.4)
MCHC RBC-ENTMCNC: 25.5 PG — LOW (ref 27–31)
MCHC RBC-ENTMCNC: 30.8 G/DL — LOW (ref 32–37)
MCV RBC AUTO: 82.6 FL — SIGNIFICANT CHANGE UP (ref 81–99)
MONOCYTES # BLD AUTO: 0.99 K/UL — HIGH (ref 0.1–0.6)
MONOCYTES NFR BLD AUTO: 8.9 % — SIGNIFICANT CHANGE UP (ref 1.7–9.3)
NEUTROPHILS # BLD AUTO: 6.51 K/UL — HIGH (ref 1.4–6.5)
NEUTROPHILS NFR BLD AUTO: 58.5 % — SIGNIFICANT CHANGE UP (ref 42.2–75.2)
NIGHT BLUE STAIN TISS: SIGNIFICANT CHANGE UP
NRBC # BLD: 0 /100 WBCS — SIGNIFICANT CHANGE UP (ref 0–0)
PLATELET # BLD AUTO: 426 K/UL — HIGH (ref 130–400)
PMV BLD: 9 FL — SIGNIFICANT CHANGE UP (ref 7.4–10.4)
POTASSIUM SERPL-MCNC: 4.4 MMOL/L — SIGNIFICANT CHANGE UP (ref 3.5–5)
POTASSIUM SERPL-SCNC: 4.4 MMOL/L — SIGNIFICANT CHANGE UP (ref 3.5–5)
PROT SERPL-MCNC: 8.4 G/DL — HIGH (ref 6–8)
RBC # BLD: 4.36 M/UL — SIGNIFICANT CHANGE UP (ref 4.2–5.4)
RBC # FLD: 15.7 % — HIGH (ref 11.5–14.5)
SODIUM SERPL-SCNC: 137 MMOL/L — SIGNIFICANT CHANGE UP (ref 135–146)
SPECIMEN SOURCE: SIGNIFICANT CHANGE UP
WBC # BLD: 11.13 K/UL — HIGH (ref 4.8–10.8)
WBC # FLD AUTO: 11.13 K/UL — HIGH (ref 4.8–10.8)

## 2024-07-30 PROCEDURE — 99232 SBSQ HOSP IP/OBS MODERATE 35: CPT

## 2024-07-30 PROCEDURE — 71045 X-RAY EXAM CHEST 1 VIEW: CPT | Mod: 26

## 2024-07-30 RX ADMIN — SENNOSIDES 2 TABLET(S): 8.6 TABLET ORAL at 21:33

## 2024-07-30 RX ADMIN — Medication 17 GRAM(S): at 11:27

## 2024-07-30 RX ADMIN — Medication 100 MILLIGRAM(S): at 05:42

## 2024-07-30 RX ADMIN — PANTOPRAZOLE SODIUM 40 MILLIGRAM(S): 20 TABLET, DELAYED RELEASE ORAL at 05:42

## 2024-07-30 RX ADMIN — Medication 17 GRAM(S): at 21:33

## 2024-07-30 NOTE — PROGRESS NOTE ADULT - ASSESSMENT
28 yo F with PMH/PSH of appendectomy non-smoker, denies daily alcohol use, presents with right flank pain for the past 2 days.     #right flank pain  #pyelonephritis  #kidney stones  #leukocytosis  -Labs : wbc 13.43 , hg 11.7 , plt 437 ,na 133, k4.3, crea 0.8   -Ua positive WBC93, RBC0, occ bacteria, moderate LE, neg nitrite   -CT A/P shows Right delayed nephrogram, marked perinephric inflammatory stranding with moderate hydronephrosis, minimal distention of renal pelvis, with right UPJ 2.2 x 1.3 x 1.3 cm calculus. Additional right renal pelvis staghorn calculus measuring 3.1 x 1.6 x 1 cm.  Findings compatible with obstructing calculus with right severe pyelonephritis. No evidence of abscess. Some imaging features raise the possibility of a chronic process, however the typical characteristics of xanthogranulomatous pyelonephritis are not appreciated at this time. Additional nonobstructing calculi within the right kidney, measuring up to 0.7 cm.  -Zofran IV push and 2g Rocephin IVPB, 15mg of Toradol IV push, tylenol  -Patient will need to f/u outpatient with urology for definitive stone management once infection is resolved.   -Urine culture: no growth  -if fever and chills take blood cultures   -IR: patient not a candidate for PCN due to inflammation  -Urology: PCNU placement will be done by IR prior to PCNL scheduled with Dr. Carter as outpatient. Continue current maangement with IV antibiotics, bladder scan, f/u urine cultures and ID recs   -ceftriaxone 2g q24 IV as per ID  -retroperitoneal US shows fullness of the right renal collecting system but not hydronephrosis  -transvaginal US- cervical cysts  -urine shows no growth of acid fast bacilli     #anemia  -mcv 81   -OP f/u       #DVT proph- none  #Diet- normal  Pending: clinical improvement, possible intervention from uro or IR 28 yo F with PMH/PSH of appendectomy non-smoker, denies daily alcohol use, presents with right flank pain for the past 2 days.     #right flank pain  #pyelonephritis  #kidney stones  #leukocytosis  -Labs : wbc 13.43 , hg 11.7 , plt 437 ,na 133, k4.3, crea 0.8   -UA positive WBC93, RBC0, occ bacteria, moderate LE, neg nitrite   -CT A/P shows Right delayed nephrogram, marked perinephric inflammatory stranding with moderate hydronephrosis, minimal distention of renal pelvis, with right UPJ 2.2 x 1.3 x 1.3 cm calculus. Additional right renal pelvis staghorn calculus measuring 3.1 x 1.6 x 1 cm.  Findings compatible with obstructing calculus with right severe pyelonephritis. No evidence of abscess. Some imaging features raise the possibility of a chronic process, however the typical characteristics of xanthogranulomatous pyelonephritis are not appreciated at this time. Additional nonobstructing calculi within the right kidney, measuring up to 0.7 cm.  -Urine culture: no growth  -if fever and chills take blood cultures   -IR: patient not a candidate for PCN due to inflammation  -Urology: PCNU placement will be done by IR prior to PCNL scheduled with Dr. Carter as outpatient.   -Patient will need to f/u outpatient with urology for definitive stone management once infection is resolved.   -retroperitoneal US shows fullness of the right renal collecting system but not hydronephrosis  -transvaginal US- cervical cysts  -urine shows no growth of acid fast bacilli will take 1 more sample today  -c/w Zofran IV push, Toradol IV push, Tylenol for nausea and pain  -c/w ceftriaxone as per ID      #anemia  -mcv 81   -OP f/u       #DVT proph- none  #Diet- normal  Pending: clinical improvement, possible intervention from uro or IR 30 yo F with PMH/PSH of appendectomy non-smoker, denies daily alcohol use, presents with right flank pain for the past 2 days.     #right flank pain  #pyelonephritis  #kidney stones  #leukocytosis  -Labs : wbc 13.43 , hg 11.7 , plt 437 ,na 133, k4.3, crea 0.8   -UA positive WBC93, RBC0, occ bacteria, moderate LE, neg nitrite   -CT A/P shows Right delayed nephrogram, marked perinephric inflammatory stranding with moderate hydronephrosis, minimal distention of renal pelvis, with right UPJ 2.2 x 1.3 x 1.3 cm calculus. Additional right renal pelvis staghorn calculus measuring 3.1 x 1.6 x 1 cm.  Findings compatible with obstructing calculus with right severe pyelonephritis. No evidence of abscess. Some imaging features raise the possibility of a chronic process, however the typical characteristics of xanthogranulomatous pyelonephritis are not appreciated at this time. Additional nonobstructing calculi within the right kidney, measuring up to 0.7 cm.  -Urine culture: no growth  -if fever and chills take blood cultures   -IR: patient not a candidate for PCN due to inflammation  -Urology: PCNU placement will be done by IR prior to PCNL scheduled with Dr. Carter as outpatient.   -Patient will need to f/u outpatient with urology for definitive stone management once infection is resolved.   -retroperitoneal US shows fullness of the right renal collecting system but not hydronephrosis  -transvaginal US- cervical cysts  -urine shows no growth of acid fast bacilli will take 1 more sample today  -c/w Zofran IV push, Toradol IV push, Tylenol for nausea and pain  -c/w ceftriaxone as per ID  -sent ova parasite stool testing  -pending QuantiFeron TB  -pending UCx acid fast    #anemia  -mcv 81   -OP f/u       #DVT proph- none  #Diet- normal  Pending: clinical improvement, possible intervention from uro or IR

## 2024-07-30 NOTE — PROGRESS NOTE ADULT - ASSESSMENT
HPI:  28 yo F with PMH/PSH of appendectomy non-smoker, denies daily alcohol use, presents with right flank pain for the past 2 days, persistent, radiates to the right side of her back, associated w/ fever , chills and urinary hesitancy, denies measured fever, n/v/d, anorexia, cough, respiratory sx, change in bowel habits or other urinary sx, vaginal d/c or other associated complaints at present.     Patient reports she had a syncopal episode 1 month ago and was taken to a [facility] who had told her she had 2 Right kidney stone.She had other complaints, like nausea, weakness. Patient reports she was told to follow-up with urology; however, did not make an appointment yet. She was not prescribed abx.     3yo had toxoplasmosis tx.    # Flank pain secondary to Right renal calculi with right pyelonephritis.  ceftriaxone   hiv , quantiferon sent   op urology follow up   urine acid fast pending   schistomsoma ab   stool o p     #Constipation resolved     PROGRESS NOTE HANDOFF    Pending:   infectious workup , bowel movement     Family discussion: patient verbalized understanding and agreeable to plan of care     Disposition: Home

## 2024-07-30 NOTE — PROGRESS NOTE ADULT - TIME-BASED
Orientation/Cognitive: britt nonverbal  Observation Goals (Met/ Not Met): inp  Mobility Level/Assist Equipment: Lift Repo  Fall Risk (Y/N): Y  Behavior Concerns: N  Pain Management: signs of distress resolved  Tele/VS/O2: Rectal Temp 102.3 tylenol given, last rectal temp 101.6. Tachy, last bp soft, RA. Deep suction done during RRT.  ABNL Lab/BG: procal, phos, ca. Bg 151  Diet: tube feeding running  Bowel/Bladder: incont. External cath. 1 large BM today  Skin Concerns: scratches scattered open to air  Drains/Devices: g tube wdl. LR inf  Tests/Procedures for next shift: possible nutrition consult  Anticipated DC date & active delays: tbd  Patient Stated Goal for Today: britt     35

## 2024-07-30 NOTE — PROGRESS NOTE ADULT - SUBJECTIVE AND OBJECTIVE BOX
TAMMIE CORNEJOBETH  29y, Female  Allergy: No Known Allergies      LOS  5d    CHIEF COMPLAINT: Urinary tract infection     (28 Jul 2024 13:49)      INTERVAL EVENTS/HPI  - No acute events overnight, pain improving   - T(F): , Max: 98.6 (07-30-24 @ 00:00)  - Denies any worsening symptoms  - Tolerating medication  - WBC Count: 11.13 (07-30-24 @ 09:07)  WBC Count: 13.67 (07-29-24 @ 09:10)     - Creatinine: 0.9 (07-30-24 @ 09:07)  Creatinine: 1.0 (07-29-24 @ 09:10)       ROS  General: Denies rigors, nightsweats  HEENT: Denies headache, rhinorrhea, sore throat, eye pain  CV: Denies CP, palpitations  PULM: Denies wheezing, hemoptysis  GI: Denies hematemesis, hematochezia, melena  : Denies discharge, hematuria  MSK: Denies arthralgias, myalgias  SKIN: Denies rash, lesions  NEURO: Denies paresthesias, weakness  PSYCH: Denies depression, anxiety    VITALS:  T(F): 97.2, Max: 98.6 (07-30-24 @ 00:00)  HR: 100  BP: 94/63  RR: 18Vital Signs Last 24 Hrs  T(C): 36.2 (30 Jul 2024 15:00), Max: 37 (30 Jul 2024 00:00)  T(F): 97.2 (30 Jul 2024 15:00), Max: 98.6 (30 Jul 2024 00:00)  HR: 100 (30 Jul 2024 15:00) (80 - 100)  BP: 94/63 (30 Jul 2024 15:00) (94/63 - 98/67)  BP(mean): --  RR: 18 (30 Jul 2024 15:00) (17 - 18)  SpO2: 100% (30 Jul 2024 15:00) (100% - 100%)    Parameters below as of 30 Jul 2024 15:00  Patient On (Oxygen Delivery Method): room air        PHYSICAL EXAM:  Gen: NAD, resting in bed  HEENT: Normocephalic, atraumatic  Neck: supple, no lymphadenopathy  CV: Regular rate & regular rhythm  Lungs: decreased BS at bases, no fremitus  Abdomen: Soft, BS present  Ext: Warm, well perfused  Neuro: non focal, awake  Skin: no rash, no erythema  Lines: no phlebitis    FH: Non-contributory  Social Hx: Non-contributory    TESTS & MEASUREMENTS:                        11.1   11.13 )-----------( 426      ( 30 Jul 2024 09:07 )             36.0     07-30    137  |  99  |  15  ----------------------------<  96  4.4   |  26  |  0.9    Ca    8.9      30 Jul 2024 09:07  Phos  4.2     07-29  Mg     2.2     07-30    TPro  8.4<H>  /  Alb  4.1  /  TBili  0.3  /  DBili  x   /  AST  20  /  ALT  18  /  AlkPhos  94  07-30      LIVER FUNCTIONS - ( 30 Jul 2024 09:07 )  Alb: 4.1 g/dL / Pro: 8.4 g/dL / ALK PHOS: 94 U/L / ALT: 18 U/L / AST: 20 U/L / GGT: x           Urinalysis Basic - ( 30 Jul 2024 09:07 )    Color: x / Appearance: x / SG: x / pH: x  Gluc: 96 mg/dL / Ketone: x  / Bili: x / Urobili: x   Blood: x / Protein: x / Nitrite: x   Leuk Esterase: x / RBC: x / WBC x   Sq Epi: x / Non Sq Epi: x / Bacteria: x        Culture - Acid Fast - Urine (collected 07-29-24 @ 13:07)  Source: .Urine Urine    Culture - Acid Fast - Urine (collected 07-28-24 @ 14:39)  Source: .Urine Urine    Urinalysis with Rflx Culture (collected 07-25-24 @ 14:57)    Culture - Urine (collected 07-25-24 @ 14:57)  Source: Clean Catch None  Final Report (07-26-24 @ 18:01):    No growth            INFECTIOUS DISEASES TESTING  HIV-1/2 Combo Result: Nonreact (07-28-24 @ 12:09)      INFLAMMATORY MARKERS      RADIOLOGY & ADDITIONAL TESTS:  I have personally reviewed the last available Chest xray  CXR      CT      CARDIOLOGY TESTING  12 Lead ECG:   Ventricular Rate 97 BPM    Atrial Rate 97 BPM    P-R Interval 152 ms    QRS Duration 66 ms    Q-T Interval 344 ms    QTC Calculation(Bazett) 436 ms    P Axis 72 degrees    R Axis 91 degrees    T Axis 81 degrees    Diagnosis Line Normal sinus rhythm  Rightward axis  Borderline ECG    Confirmed by EDWIN DENNIS MD ([id number]) on 7/26/2024 7:15:46 AM (07-25-24 @ 20:58)      MEDICATIONS  cefTRIAXone   IVPB 2000 IV Intermittent every 24 hours  pantoprazole    Tablet 40 Oral before breakfast  polyethylene glycol 3350 17 Oral two times a day  senna 2 Oral at bedtime      WEIGHT  Weight (kg): 51 (07-25-24 @ 13:43)  Creatinine: 0.9 mg/dL (07-30-24 @ 09:07)      ANTIBIOTICS:  cefTRIAXone   IVPB 2000 milliGRAM(s) IV Intermittent every 24 hours      All available historical records have been reviewed

## 2024-07-30 NOTE — PROGRESS NOTE ADULT - ASSESSMENT
ASSESSMENT  28 yo F with PMH/PSH of appendectomy non-smoker, denies daily alcohol use, presents with right flank pain for the past 2 days, persistent, radiates to the right side of her back, associated w/ fever , chills and urinary hesitancy, denies measured fever, n/v/d, anorexia, cough, respiratory sx, change in bowel habits or other urinary sx, vaginal d/c or other associated complaints at present.     IMPRESSION  #Eosinophilia   #R pyelonephritis in the setting of calculi with Sepsis on admission P>90 WBC 13  On admission P>90 WBC 13    UA WBC 93 ; 7/25 UCX NG     Urine AFB NG  CT A/P shows Right delayed nephrogram, marked perinephric inflammatory stranding with moderate hydronephrosis, minimal distention of renal pelvis, with right UPJ 2.2 x 1.3 x 1.3 cm calculus. Additional right renal pelvis staghorn calculus measuring 3.1 x 1.6 x 1 cm.  Findings compatible with obstructing calculus with right severe pyelonephritis. No evidence of abscess. Some imaging features raise the possibility of a chronic process, however the typical characteristics of xanthogranulomatous pyelonephritis are not appreciated at this time. Additional nonobstructing calculi within the right kidney, measuring up to 0.7 cm.  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   #Abx allergy: No Known Allergies    Creatinine: 0.8 (07-25-24 @ 14:57)      Weight (kg): 51 (07-25-24 @ 13:43)    RECOMMENDATIONS  - Ceftriaxone 2g q24h IV  - UCX NG  - quantiferon gold,   - CXR  - Eosinophilia send strongy Ab & schisto Ab, urine O&P if possible  - Appreciate Urology consult-  - Appreciate IR consult- no indication for PCN  - Anticipate D/C with close ID f/u & PO bactrim 1 DS BID to complete 14 days total of antibiotics       If any questions, please send a message or call on [employer] Teams  Please continue to update ID with any pertinent new laboratory, radiographic findings, or change in clinical status

## 2024-07-30 NOTE — PROGRESS NOTE ADULT - SUBJECTIVE AND OBJECTIVE BOX
SUBJECTIVE/OVERNIGHT EVENTS  Today is hospital day 5d. This morning patient was seen and examined at bedside, resting comfortably in bed. No acute or major events overnight.    HOSPITAL COURSE  Day 1:   Day 2:   Day 3:     CODE STATUS:    FAMILY COMMUNICATION  Contact date:  Name of person contacted:  Relationship to patient:  Communication details:    MEDICATIONS  STANDING MEDICATIONS  cefTRIAXone   IVPB 2000 milliGRAM(s) IV Intermittent every 24 hours  pantoprazole    Tablet 40 milliGRAM(s) Oral before breakfast  polyethylene glycol 3350 17 Gram(s) Oral two times a day  senna 2 Tablet(s) Oral at bedtime    PRN MEDICATIONS  acetaminophen     Tablet .. 650 milliGRAM(s) Oral every 6 hours PRN  aluminum hydroxide/magnesium hydroxide/simethicone Suspension 30 milliLiter(s) Oral every 4 hours PRN  melatonin 3 milliGRAM(s) Oral at bedtime PRN  ondansetron Injectable 4 milliGRAM(s) IV Push every 8 hours PRN    VITALS  T(F): 98.6 (07-30-24 @ 00:00), Max: 98.6 (07-30-24 @ 00:00)  HR: 80 (07-30-24 @ 00:00) (80 - 86)  BP: 98/67 (07-30-24 @ 00:00) (98/67 - 117/72)  RR: 17 (07-30-24 @ 00:00) (17 - 18)  SpO2: 98% (07-29-24 @ 15:00) (98% - 98%)    PHYSICAL EXAM  GENERAL  (  ) NAD, lying in bed comfortably     (  ) obtunded     (  ) lethargic     (  ) somnolent    HEAD  (  ) Atraumatic     (  ) hematoma     (  ) laceration (specify location:       )     NECK  (  ) Supple     (  ) neck stiffness     (  ) nuchal rigidity     (  )  no JVD     (  ) JVD present ( -- cm)    HEART  Rate -->  (  ) normal rate    (  ) bradycardic    (  ) tachycardic  Rhythm -->  (  ) regular    (  ) regularly irregular    (  ) irregularly irregular  Murmurs -->  (  ) normal s1/s2    (  ) systolic murmur    (  ) diastolic murmur    (  ) continuous murmur     (  ) S3 present    (  ) S4 present    LUNGS  (  )Unlabored respirations     (  ) tachypnea  (  ) B/L air entry     (  ) decreased breath sounds in:  (location     )    (  ) no adventitious sound     (  ) crackles     (  ) wheezing      (  ) rhonchi      (specify location:       )  (  ) chest wall tenderness (specify location:       )    ABDOMEN  (  ) Soft     (  ) tense   |   (  ) nondistended     (  ) distended   |   (  ) +BS     (  ) hypoactive bowel sounds     (  ) hyperactive bowel sounds  (  ) nontender     (  ) RUQ tenderness     (  ) RLQ tenderness     (  ) LLQ tenderness     (  ) epigastric tenderness     (  ) diffuse tenderness  (  ) Splenomegaly      (  ) Hepatomegaly      (  ) Jaundice     (  ) ecchymosis     EXTREMITIES  (  ) Normal     (  ) Rash     (  ) ecchymosis     (  ) varicose veins      (  ) pitting edema     (  ) non-pitting edema   (  ) ulceration     (  ) gangrene:     (location:     )    NERVOUS SYSTEM  (  ) A&Ox3     (  ) confused     (  ) lethargic  CN II-XII:     (  ) Intact     (  ) focal deficits  (Specify:     )   Upper extremities:     (  ) strength X/5     (  ) focal deficit (specify:    )  Lower extremities:     (  ) strength  X/5    (  ) focal deficit (specify:    )    SKIN  (  ) No rashes or lesions     (  ) maculopapular rash     (  ) pustules     (  ) vesicles     (  ) ulcer     (  ) ecchymosis     (specify location:     )    (  ) Indwelling Parker Catheter   Date insterted:    Reason (  ) Critical illness     (  ) urinary retention    (  ) Accurate Ins/Outs Monitoring     (  ) CMO patient    (  ) Central Line  Date inserted:  Location: (  ) Right IJ   (  ) Left IJ   (  ) Right Fem   (  ) Left Fem    (  ) SPC  (  ) pigtail  (  ) PEG tube  (  ) colostomy  (  ) jejunostomy  (  ) U-Dall    LABS             12.0   13.67 )-----------( 461      ( 07-29-24 @ 09:10 )             38.2     137  |  99  |  15  -------------------------<  87   07-29-24 @ 09:10  4.2  |  25  |  1.0    Ca      9.2     07-29-24 @ 09:10  Phos   4.2     07-29-24 @ 09:10  Mg     2.2     07-29-24 @ 09:10    TPro  8.9  /  Alb  4.3  /  TBili  0.4  /  DBili  x   /  AST  18  /  ALT  14  /  AlkPhos  89  /  GGT  x     07-29-24 @ 09:10        Urinalysis Basic - ( 29 Jul 2024 09:10 )    Color: x / Appearance: x / SG: x / pH: x  Gluc: 87 mg/dL / Ketone: x  / Bili: x / Urobili: x   Blood: x / Protein: x / Nitrite: x   Leuk Esterase: x / RBC: x / WBC x   Sq Epi: x / Non Sq Epi: x / Bacteria: x          Culture - Acid Fast - Urine (collected 28 Jul 2024 14:39)  Source: .Urine Urine      IMAGING SUBJECTIVE/OVERNIGHT EVENTS  Today is hospital day 5d. This morning patient was seen and examined at bedside, resting comfortably in bed. No acute or major events overnight.    MEDICATIONS  STANDING MEDICATIONS  cefTRIAXone   IVPB 2000 milliGRAM(s) IV Intermittent every 24 hours  pantoprazole    Tablet 40 milliGRAM(s) Oral before breakfast  polyethylene glycol 3350 17 Gram(s) Oral two times a day  senna 2 Tablet(s) Oral at bedtime    PRN MEDICATIONS  acetaminophen     Tablet .. 650 milliGRAM(s) Oral every 6 hours PRN  aluminum hydroxide/magnesium hydroxide/simethicone Suspension 30 milliLiter(s) Oral every 4 hours PRN  melatonin 3 milliGRAM(s) Oral at bedtime PRN  ondansetron Injectable 4 milliGRAM(s) IV Push every 8 hours PRN    VITALS  T(F): 98.6 (07-30-24 @ 00:00), Max: 98.6 (07-30-24 @ 00:00)  HR: 80 (07-30-24 @ 00:00) (80 - 86)  BP: 98/67 (07-30-24 @ 00:00) (98/67 - 117/72)  RR: 17 (07-30-24 @ 00:00) (17 - 18)  SpO2: 98% (07-29-24 @ 15:00) (98% - 98%)    PHYSICAL EXAM  GENERAL  NAD, lying in bed comfortably       ABDOMEN  soft NTND      LABS             12.0   13.67 )-----------( 461      ( 07-29-24 @ 09:10 )             38.2     137  |  99  |  15  -------------------------<  87   07-29-24 @ 09:10  4.2  |  25  |  1.0    Ca      9.2     07-29-24 @ 09:10  Phos   4.2     07-29-24 @ 09:10  Mg     2.2     07-29-24 @ 09:10    TPro  8.9  /  Alb  4.3  /  TBili  0.4  /  DBili  x   /  AST  18  /  ALT  14  /  AlkPhos  89  /  GGT  x     07-29-24 @ 09:10        Urinalysis Basic - ( 29 Jul 2024 09:10 )    Color: x / Appearance: x / SG: x / pH: x  Gluc: 87 mg/dL / Ketone: x  / Bili: x / Urobili: x   Blood: x / Protein: x / Nitrite: x   Leuk Esterase: x / RBC: x / WBC x   Sq Epi: x / Non Sq Epi: x / Bacteria: x          Culture - Acid Fast - Urine (collected 28 Jul 2024 14:39)  Source: .Urine Urine      IMAGING

## 2024-07-30 NOTE — PROGRESS NOTE ADULT - SUBJECTIVE AND OBJECTIVE BOX
VON CORNEJO  29y  [facility] [address]      Patient is a 29y old  Female who presents with a chief complaint of Urinary tract infection     (28 Jul 2024 13:49)      My note supersedes the resident's note      INTERVAL HPI/OVERNIGHT EVENTS:  no acute events overnight       REVIEW OF SYSTEMS:  CONSTITUTIONAL: No fever, weight loss, or fatigue  EYES: No eye pain, visual disturbances, or discharge  ENMT:  No difficulty hearing, tinnitus, vertigo; No sinus or throat pain  NECK: No pain or stiffness  BREASTS: No pain, masses, or nipple discharge  RESPIRATORY: No cough, wheezing, chills or hemoptysis; No shortness of breath  CARDIOVASCULAR: No chest pain, palpitations, dizziness, or leg swelling  GASTROINTESTINAL: No abdominal or epigastric pain. No nausea, vomiting, or hematemesis; No diarrhea or constipation. No melena or hematochezia.  GENITOURINARY: + dysuria   NEUROLOGICAL: No headaches, memory loss, loss of strength, numbness, or tremors  SKIN: No itching, burning, rashes, or lesions   LYMPH NODES: No enlarged glands  ENDOCRINE: No heat or cold intolerance; No hair loss  MUSCULOSKELETAL: No joint pain or swelling; No muscle, back, or extremity pain  PSYCHIATRIC: No depression, anxiety, mood swings, or difficulty sleeping  HEME/LYMPH: No easy bruising, or bleeding gums  ALLERY AND IMMUNOLOGIC: No hives or eczema  FAMILY HISTORY:    T(C): 36.7 (07-30-24 @ 07:30), Max: 37 (07-30-24 @ 00:00)  HR: 80 (07-30-24 @ 07:30) (80 - 86)  BP: 96/65 (07-30-24 @ 07:30) (96/65 - 117/72)  RR: 17 (07-30-24 @ 00:00) (17 - 18)  SpO2: 98% (07-29-24 @ 15:00) (98% - 98%)  Wt(kg): --Vital Signs Last 24 Hrs  T(C): 36.7 (30 Jul 2024 07:30), Max: 37 (30 Jul 2024 00:00)  T(F): 98 (30 Jul 2024 07:30), Max: 98.6 (30 Jul 2024 00:00)  HR: 80 (30 Jul 2024 07:30) (80 - 86)  BP: 96/65 (30 Jul 2024 07:30) (96/65 - 117/72)  BP(mean): --  RR: 17 (30 Jul 2024 00:00) (17 - 18)  SpO2: 98% (29 Jul 2024 15:00) (98% - 98%)    Parameters below as of 29 Jul 2024 15:00  Patient On (Oxygen Delivery Method): room air        PHYSICAL EXAM:  GENERAL: NAD,   HEAD:  Atraumatic, Normocephalic  EYES: EOMI, PERRLA, conjunctiva and sclera clear  ENMT: No tonsillar erythema, exudates, or enlargement; Moist mucous membranes, Good dentition, No lesions  NECK: Supple, No JVD, Normal thyroid  NERVOUS SYSTEM:  Alert & Oriented X3, Good concentration; Motor Strength 5/5 B/L upper and lower extremities; DTRs 2+ intact and symmetric  PULM: Clear to auscultation bilaterally  CARDIAC: Regular rate and rhythm; No murmurs, rubs, or gallops  GI: Soft, Nontender, Nondistended; Bowel sounds present  EXTREMITIES:  2+ Peripheral Pulses, No clubbing, cyanosis, or edema  LYMPH: No lymphadenopathy noted  SKIN: No rashes or lesions    Consultant(s) Notes Reviewed:  [x ] YES  [ ] NO  Care Discussed with Consultants/Other Providers [ x] YES  [ ] NO    LABS:                            11.1   11.13 )-----------( 426      ( 30 Jul 2024 09:07 )             36.0   07-30    137  |  99  |  15  ----------------------------<  96  4.4   |  26  |  0.9    Ca    8.9      30 Jul 2024 09:07  Phos  4.2     07-29  Mg     2.2     07-30    TPro  8.4<H>  /  Alb  4.1  /  TBili  0.3  /  DBili  x   /  AST  20  /  ALT  18  /  AlkPhos  94  07-30            Culture - Acid Fast - Urine (collected 28 Jul 2024 14:39)  Source: .Urine Urine      acetaminophen     Tablet .. 650 milliGRAM(s) Oral every 6 hours PRN  aluminum hydroxide/magnesium hydroxide/simethicone Suspension 30 milliLiter(s) Oral every 4 hours PRN  cefTRIAXone   IVPB 2000 milliGRAM(s) IV Intermittent every 24 hours  melatonin 3 milliGRAM(s) Oral at bedtime PRN  ondansetron Injectable 4 milliGRAM(s) IV Push every 8 hours PRN  pantoprazole    Tablet 40 milliGRAM(s) Oral before breakfast  polyethylene glycol 3350 17 Gram(s) Oral two times a day  senna 2 Tablet(s) Oral at bedtime      HEALTH ISSUES - PROBLEM Dx:          Case Discussed with House Staff   45 minutes spent on total encounter; more than 50% of the visit was spent counseling and/or coordinating care by the attending physician.    Spectra [phone]

## 2024-07-31 LAB
ALBUMIN SERPL ELPH-MCNC: 3.9 G/DL — SIGNIFICANT CHANGE UP (ref 3.5–5.2)
ALP SERPL-CCNC: 84 U/L — SIGNIFICANT CHANGE UP (ref 30–115)
ALT FLD-CCNC: 21 U/L — SIGNIFICANT CHANGE UP (ref 0–41)
ANION GAP SERPL CALC-SCNC: 9 MMOL/L — SIGNIFICANT CHANGE UP (ref 7–14)
AST SERPL-CCNC: 22 U/L — SIGNIFICANT CHANGE UP (ref 0–41)
BASOPHILS # BLD AUTO: 0 K/UL — SIGNIFICANT CHANGE UP (ref 0–0.2)
BASOPHILS NFR BLD AUTO: 0 % — SIGNIFICANT CHANGE UP (ref 0–1)
BILIRUB SERPL-MCNC: 0.3 MG/DL — SIGNIFICANT CHANGE UP (ref 0.2–1.2)
BUN SERPL-MCNC: 17 MG/DL — SIGNIFICANT CHANGE UP (ref 10–20)
CALCIUM SERPL-MCNC: 9.1 MG/DL — SIGNIFICANT CHANGE UP (ref 8.4–10.5)
CHLORIDE SERPL-SCNC: 101 MMOL/L — SIGNIFICANT CHANGE UP (ref 98–110)
CO2 SERPL-SCNC: 28 MMOL/L — SIGNIFICANT CHANGE UP (ref 17–32)
CREAT SERPL-MCNC: 1 MG/DL — SIGNIFICANT CHANGE UP (ref 0.7–1.5)
CULTURE RESULTS: SIGNIFICANT CHANGE UP
EGFR: 79 ML/MIN/1.73M2 — SIGNIFICANT CHANGE UP
EOSINOPHIL # BLD AUTO: 1.85 K/UL — HIGH (ref 0–0.7)
EOSINOPHIL NFR BLD AUTO: 16.1 % — HIGH (ref 0–8)
GLUCOSE SERPL-MCNC: 81 MG/DL — SIGNIFICANT CHANGE UP (ref 70–99)
HCT VFR BLD CALC: 35.1 % — LOW (ref 37–47)
HGB BLD-MCNC: 11 G/DL — LOW (ref 12–16)
LYMPHOCYTES # BLD AUTO: 2.36 K/UL — SIGNIFICANT CHANGE UP (ref 1.2–3.4)
LYMPHOCYTES # BLD AUTO: 20.5 % — SIGNIFICANT CHANGE UP (ref 20.5–51.1)
MAGNESIUM SERPL-MCNC: 2.1 MG/DL — SIGNIFICANT CHANGE UP (ref 1.8–2.4)
MCHC RBC-ENTMCNC: 25.5 PG — LOW (ref 27–31)
MCHC RBC-ENTMCNC: 31.3 G/DL — LOW (ref 32–37)
MCV RBC AUTO: 81.4 FL — SIGNIFICANT CHANGE UP (ref 81–99)
MONOCYTES # BLD AUTO: 0.62 K/UL — HIGH (ref 0.1–0.6)
MONOCYTES NFR BLD AUTO: 5.4 % — SIGNIFICANT CHANGE UP (ref 1.7–9.3)
NEUTROPHILS # BLD AUTO: 6.57 K/UL — HIGH (ref 1.4–6.5)
NEUTROPHILS NFR BLD AUTO: 57.1 % — SIGNIFICANT CHANGE UP (ref 42.2–75.2)
NIGHT BLUE STAIN TISS: SIGNIFICANT CHANGE UP
PLATELET # BLD AUTO: 412 K/UL — HIGH (ref 130–400)
PMV BLD: 8.8 FL — SIGNIFICANT CHANGE UP (ref 7.4–10.4)
POTASSIUM SERPL-MCNC: 4.7 MMOL/L — SIGNIFICANT CHANGE UP (ref 3.5–5)
POTASSIUM SERPL-SCNC: 4.7 MMOL/L — SIGNIFICANT CHANGE UP (ref 3.5–5)
PROT SERPL-MCNC: 8.4 G/DL — HIGH (ref 6–8)
RBC # BLD: 4.31 M/UL — SIGNIFICANT CHANGE UP (ref 4.2–5.4)
RBC # FLD: 15.7 % — HIGH (ref 11.5–14.5)
SODIUM SERPL-SCNC: 138 MMOL/L — SIGNIFICANT CHANGE UP (ref 135–146)
SPECIMEN SOURCE: SIGNIFICANT CHANGE UP
SPECIMEN SOURCE: SIGNIFICANT CHANGE UP
WBC # BLD: 11.51 K/UL — HIGH (ref 4.8–10.8)
WBC # FLD AUTO: 11.51 K/UL — HIGH (ref 4.8–10.8)

## 2024-07-31 PROCEDURE — 99232 SBSQ HOSP IP/OBS MODERATE 35: CPT

## 2024-07-31 RX ORDER — FLUCONAZOLE 150 MG
200 TABLET ORAL DAILY
Refills: 0 | Status: ACTIVE | OUTPATIENT
Start: 2024-07-31 | End: 2024-08-03

## 2024-07-31 RX ORDER — PROBIOTIC PRODUCT - TAB 1B-250 MG
1 TAB ORAL
Refills: 0 | Status: DISCONTINUED | OUTPATIENT
Start: 2024-07-31 | End: 2024-08-02

## 2024-07-31 RX ADMIN — PANTOPRAZOLE SODIUM 40 MILLIGRAM(S): 20 TABLET, DELAYED RELEASE ORAL at 05:10

## 2024-07-31 RX ADMIN — Medication 17 GRAM(S): at 14:53

## 2024-07-31 RX ADMIN — Medication 200 MILLIGRAM(S): at 17:57

## 2024-07-31 RX ADMIN — Medication 17 GRAM(S): at 21:24

## 2024-07-31 RX ADMIN — Medication 100 MILLIGRAM(S): at 05:11

## 2024-07-31 RX ADMIN — PROBIOTIC PRODUCT - TAB 1 TABLET(S): TAB at 17:56

## 2024-07-31 RX ADMIN — SENNOSIDES 2 TABLET(S): 8.6 TABLET ORAL at 21:24

## 2024-07-31 NOTE — PROGRESS NOTE ADULT - ATTENDING COMMENTS
Medicine Attending Addendum  Patient was seen and examined with medicine team.  Nursing records reviewed. I agree with the resident/PA/NP's note including past medical history, home medications, social history, allergies, surgical history, family history, and review of system. I have reviewed relevant vitals, laboratory values, imaging studies, and microbiology.   - Above resident's note was edited by me  - art pending stool culture results  - rest of A/P as per detailed housestaff note above except above modifications Medicine Attending Addendum  Patient was seen and examined with medicine team.  Nursing records reviewed. I agree with the resident/PA/NP's note including past medical history, home medications, social history, allergies, surgical history, family history, and review of system. I have reviewed relevant vitals, laboratory values, imaging studies, and microbiology.   - Above resident's note was edited by me  - dispo pending stool culture results  - started on probiotics and fluconazole for suspected vaginal candidosis in setting of antibiotics  - rest of A/P as per detailed housestaff note above except above modifications

## 2024-07-31 NOTE — PROGRESS NOTE ADULT - ASSESSMENT
30 yo F with PMH/PSH of appendectomy non-smoker, denies daily alcohol use, presents with right flank pain for the past 2 days.     #right flank pain  #pyelonephritis  #kidney stones  #leukocytosis  -Labs : wbc 13.43 , hg 11.7 , plt 437 ,na 133, k4.3, crea 0.8   -UA positive WBC93, RBC0, occ bacteria, moderate LE, neg nitrite   -CT A/P shows Right delayed nephrogram, marked perinephric inflammatory stranding with moderate hydronephrosis, minimal distention of renal pelvis, with right UPJ 2.2 x 1.3 x 1.3 cm calculus. Additional right renal pelvis staghorn calculus measuring 3.1 x 1.6 x 1 cm.  Findings compatible with obstructing calculus with right severe pyelonephritis. No evidence of abscess. Some imaging features raise the possibility of a chronic process, however the typical characteristics of xanthogranulomatous pyelonephritis are not appreciated at this time. Additional nonobstructing calculi within the right kidney, measuring up to 0.7 cm.  -Urine culture: no growth  -if fever and chills take blood cultures   -IR: patient not a candidate for PCN due to inflammation  -Urology: PCNU placement will be done by IR prior to PCNL scheduled with Dr. Carter as outpatient.   -Patient will need to f/u outpatient with urology for definitive stone management once infection is resolved.   -retroperitoneal US shows fullness of the right renal collecting system but not hydronephrosis  -transvaginal US- cervical cysts  -urine shows no growth of acid fast bacilli will take 1 more sample today  -c/w Zofran IV push, Toradol IV push, Tylenol for nausea and pain  -c/w ceftriaxone as per ID  -pending ova parasite stool testing, and strongy Ab & schisto Ab stool as per ID rec given eosinophilia  -ordered CXR as per ID rec, CXR wnl  -pending QuantiFeron TB  -pending UCx acid fast    #anemia  -mcv 81   -OP f/u       #DVT proph- none  #Diet- normal  Pending: clinical improvement, possible intervention from uro or IR 30 yo F with PMH/PSH of appendectomy non-smoker, denies daily alcohol use, presents with right flank pain for the past 2 days.     #right flank pain  #pyelonephritis  #kidney stones  #leukocytosis  -Labs : wbc 13.43 , hg 11.7 , plt 437 ,na 133, k4.3, crea 0.8   -UA positive WBC93, RBC0, occ bacteria, moderate LE, neg nitrite   -CT A/P shows Right delayed nephrogram, marked perinephric inflammatory stranding with moderate hydronephrosis, minimal distention of renal pelvis, with right UPJ 2.2 x 1.3 x 1.3 cm calculus. Additional right renal pelvis staghorn calculus measuring 3.1 x 1.6 x 1 cm.  Findings compatible with obstructing calculus with right severe pyelonephritis. No evidence of abscess. Some imaging features raise the possibility of a chronic process, however the typical characteristics of xanthogranulomatous pyelonephritis are not appreciated at this time. Additional nonobstructing calculi within the right kidney, measuring up to 0.7 cm.  -Urine culture: no growth  -if fever and chills take blood cultures   -IR: patient not a candidate for PCN due to inflammation  -Urology: PCNU placement will be done by IR prior to PCNL scheduled with Dr. Carter as outpatient.   -Patient will need to f/u outpatient with urology for definitive stone management once infection is resolved.   -retroperitoneal US shows fullness of the right renal collecting system but not hydronephrosis  -transvaginal US- cervical cysts  -urine shows no growth of acid fast bacilli will take 1 more sample today  -c/w Zofran IV push, Toradol IV push, Tylenol for nausea and pain  -c/w ceftriaxone as per ID  -pending ova parasite stool testing, and strongy Ab & schisto Ab stool as per ID rec given eosinophilia  -ordered CXR as per ID rec, CXR wnl  -pending QuantiFeron TB  -pending UCx acid fast  -FU uro    #anemia  -mcv 81   -OP f/u       #DVT proph- none  #Diet- normal  Pending: anticipated for tomorrow 30 yo F with PMH/PSH of appendectomy non-smoker, denies daily alcohol use, presents with right flank pain for the past 2 days.     #right flank pain  #pyelonephritis  #kidney stones  #leukocytosis  -Labs : wbc 13.43 , hg 11.7 , plt 437 ,na 133, k4.3, crea 0.8   -UA positive WBC93, RBC0, occ bacteria, moderate LE, neg nitrite   -CT A/P shows Right delayed nephrogram, marked perinephric inflammatory stranding with moderate hydronephrosis, minimal distention of renal pelvis, with right UPJ 2.2 x 1.3 x 1.3 cm calculus. Additional right renal pelvis staghorn calculus measuring 3.1 x 1.6 x 1 cm.  Findings compatible with obstructing calculus with right severe pyelonephritis. No evidence of abscess. Some imaging features raise the possibility of a chronic process, however the typical characteristics of xanthogranulomatous pyelonephritis are not appreciated at this time. Additional nonobstructing calculi within the right kidney, measuring up to 0.7 cm.  -Urine culture: no growth  -if fever and chills take blood cultures   -IR: patient not a candidate for PCN due to inflammation  -Urology: PCNU placement will be done by IR prior to PCNL scheduled with Dr. Carter as outpatient.   -Patient will need to f/u outpatient with urology for definitive stone management once infection is resolved.   -retroperitoneal US shows fullness of the right renal collecting system but not hydronephrosis  -transvaginal US- cervical cysts  -urine shows no growth of acid fast bacilli will take 1 more sample today  -c/w Zofran IV push, Toradol IV push, Tylenol for nausea and pain  -c/w ceftriaxone as per ID  -pending ova parasite stool testing, and strongy Ab & schisto Ab stool as per ID rec given eosinophilia  -ordered CXR as per ID rec, CXR wnl  -pending QuantiFeron TB  -pending UCx acid fast  -called uro to FU but noone answered will try again jah    #anemia  -mcv 81   -OP f/u       #DVT proph- none  #Diet- normal  Pending: anticipated for tomorrow

## 2024-08-01 ENCOUNTER — TRANSCRIPTION ENCOUNTER (OUTPATIENT)
Age: 29
End: 2024-08-01

## 2024-08-01 PROBLEM — Z78.9 OTHER SPECIFIED HEALTH STATUS: Chronic | Status: ACTIVE | Noted: 2024-07-25

## 2024-08-01 LAB
ALBUMIN SERPL ELPH-MCNC: 4.1 G/DL — SIGNIFICANT CHANGE UP (ref 3.5–5.2)
ALP SERPL-CCNC: 104 U/L — SIGNIFICANT CHANGE UP (ref 30–115)
ALT FLD-CCNC: 22 U/L — SIGNIFICANT CHANGE UP (ref 0–41)
ANION GAP SERPL CALC-SCNC: 11 MMOL/L — SIGNIFICANT CHANGE UP (ref 7–14)
AST SERPL-CCNC: 20 U/L — SIGNIFICANT CHANGE UP (ref 0–41)
BASOPHILS # BLD AUTO: 0.07 K/UL — SIGNIFICANT CHANGE UP (ref 0–0.2)
BASOPHILS NFR BLD AUTO: 0.5 % — SIGNIFICANT CHANGE UP (ref 0–1)
BILIRUB SERPL-MCNC: 0.4 MG/DL — SIGNIFICANT CHANGE UP (ref 0.2–1.2)
BUN SERPL-MCNC: 17 MG/DL — SIGNIFICANT CHANGE UP (ref 10–20)
CALCIUM SERPL-MCNC: 8.7 MG/DL — SIGNIFICANT CHANGE UP (ref 8.4–10.5)
CHLORIDE SERPL-SCNC: 97 MMOL/L — LOW (ref 98–110)
CO2 SERPL-SCNC: 26 MMOL/L — SIGNIFICANT CHANGE UP (ref 17–32)
CREAT SERPL-MCNC: 1 MG/DL — SIGNIFICANT CHANGE UP (ref 0.7–1.5)
EGFR: 79 ML/MIN/1.73M2 — SIGNIFICANT CHANGE UP
EOSINOPHIL # BLD AUTO: 2.17 K/UL — HIGH (ref 0–0.7)
EOSINOPHIL NFR BLD AUTO: 16.6 % — HIGH (ref 0–8)
GAMMA INTERFERON BACKGROUND BLD IA-ACNC: 0.04 IU/ML — SIGNIFICANT CHANGE UP
GLUCOSE SERPL-MCNC: 72 MG/DL — SIGNIFICANT CHANGE UP (ref 70–99)
HCT VFR BLD CALC: 35.8 % — LOW (ref 37–47)
HGB BLD-MCNC: 11.2 G/DL — LOW (ref 12–16)
IMM GRANULOCYTES NFR BLD AUTO: 0.8 % — HIGH (ref 0.1–0.3)
LYMPHOCYTES # BLD AUTO: 16.1 % — LOW (ref 20.5–51.1)
LYMPHOCYTES # BLD AUTO: 2.1 K/UL — SIGNIFICANT CHANGE UP (ref 1.2–3.4)
M TB IFN-G BLD-IMP: NEGATIVE — SIGNIFICANT CHANGE UP
M TB IFN-G CD4+ BCKGRND COR BLD-ACNC: 0 IU/ML — SIGNIFICANT CHANGE UP
M TB IFN-G CD4+CD8+ BCKGRND COR BLD-ACNC: 0.01 IU/ML — SIGNIFICANT CHANGE UP
MAGNESIUM SERPL-MCNC: 2.1 MG/DL — SIGNIFICANT CHANGE UP (ref 1.8–2.4)
MCHC RBC-ENTMCNC: 25.6 PG — LOW (ref 27–31)
MCHC RBC-ENTMCNC: 31.3 G/DL — LOW (ref 32–37)
MCV RBC AUTO: 81.7 FL — SIGNIFICANT CHANGE UP (ref 81–99)
MONOCYTES # BLD AUTO: 1.35 K/UL — HIGH (ref 0.1–0.6)
MONOCYTES NFR BLD AUTO: 10.3 % — HIGH (ref 1.7–9.3)
NEUTROPHILS # BLD AUTO: 7.29 K/UL — HIGH (ref 1.4–6.5)
NEUTROPHILS NFR BLD AUTO: 55.7 % — SIGNIFICANT CHANGE UP (ref 42.2–75.2)
NRBC # BLD: 0 /100 WBCS — SIGNIFICANT CHANGE UP (ref 0–0)
PLATELET # BLD AUTO: 469 K/UL — HIGH (ref 130–400)
PMV BLD: 9.1 FL — SIGNIFICANT CHANGE UP (ref 7.4–10.4)
POTASSIUM SERPL-MCNC: 4.6 MMOL/L — SIGNIFICANT CHANGE UP (ref 3.5–5)
POTASSIUM SERPL-SCNC: 4.6 MMOL/L — SIGNIFICANT CHANGE UP (ref 3.5–5)
PROT SERPL-MCNC: 8.2 G/DL — HIGH (ref 6–8)
QUANT TB PLUS MITOGEN MINUS NIL: 6.54 IU/ML — SIGNIFICANT CHANGE UP
RBC # BLD: 4.38 M/UL — SIGNIFICANT CHANGE UP (ref 4.2–5.4)
RBC # FLD: 15.6 % — HIGH (ref 11.5–14.5)
SODIUM SERPL-SCNC: 134 MMOL/L — LOW (ref 135–146)
STRONGYLOIDES AB SER-ACNC: POSITIVE
WBC # BLD: 13.08 K/UL — HIGH (ref 4.8–10.8)
WBC # FLD AUTO: 13.08 K/UL — HIGH (ref 4.8–10.8)

## 2024-08-01 PROCEDURE — 99233 SBSQ HOSP IP/OBS HIGH 50: CPT

## 2024-08-01 PROCEDURE — 76775 US EXAM ABDO BACK WALL LIM: CPT | Mod: 26

## 2024-08-01 PROCEDURE — 99253 IP/OBS CNSLTJ NEW/EST LOW 45: CPT

## 2024-08-01 RX ORDER — SULFAMETHOXAZOLE AND TRIMETHOPRIM 400; 80 MG/1; MG/1
1 TABLET ORAL EVERY 12 HOURS
Refills: 0 | Status: DISCONTINUED | OUTPATIENT
Start: 2024-08-01 | End: 2024-08-02

## 2024-08-01 RX ORDER — CLOTRIMAZOLE 1 %
1 CREAM (GRAM) TOPICAL DAILY
Refills: 0 | Status: DISCONTINUED | OUTPATIENT
Start: 2024-08-01 | End: 2024-08-02

## 2024-08-01 RX ORDER — CHLORHEXIDINE GLUCONATE 500 MG/1
1 CLOTH TOPICAL
Refills: 0 | Status: DISCONTINUED | OUTPATIENT
Start: 2024-08-01 | End: 2024-08-02

## 2024-08-01 RX ADMIN — Medication 200 MILLIGRAM(S): at 11:11

## 2024-08-01 RX ADMIN — Medication 17 GRAM(S): at 22:07

## 2024-08-01 RX ADMIN — PROBIOTIC PRODUCT - TAB 1 TABLET(S): TAB at 17:28

## 2024-08-01 RX ADMIN — PROBIOTIC PRODUCT - TAB 1 TABLET(S): TAB at 08:22

## 2024-08-01 RX ADMIN — Medication 4 MILLIGRAM(S): at 17:30

## 2024-08-01 RX ADMIN — Medication 100 MILLIGRAM(S): at 05:28

## 2024-08-01 RX ADMIN — Medication 17 GRAM(S): at 11:17

## 2024-08-01 RX ADMIN — Medication 10.5 MILLIGRAM(S): at 22:08

## 2024-08-01 RX ADMIN — SULFAMETHOXAZOLE AND TRIMETHOPRIM 1 TABLET(S): 400; 80 TABLET ORAL at 17:28

## 2024-08-01 RX ADMIN — PANTOPRAZOLE SODIUM 40 MILLIGRAM(S): 20 TABLET, DELAYED RELEASE ORAL at 05:28

## 2024-08-01 NOTE — DISCHARGE NOTE PROVIDER - HOSPITAL COURSE
28 yo F with PMH/PSH of appendectomy non-smoker, denies daily alcohol use, presents with right flank pain for the past 2 days, persistent, radiates to the right side of her back, associated w/ fever , chills and urinary hesitancy, denies measured fever, n/v/d, anorexia, cough, respiratory sx, change in bowel habits or other urinary sx, vaginal d/c or other associated complaints at present.     Patient reports she had a syncopal episode 1 month ago and was taken to a [facility] who had told her she had 2 Right kidney stone.She had other complaints, like nausea, weakness. Patient reports she was told to follow-up with urology; however, did not make an appointment yet. She was not prescribed abx.     3yo had toxoplasmosis tx.    In ED,  -Vital Signs Last 24 Hrs  T(C): 36.8 (25 Jul 2024 16:02), Max: 36.9 (25 Jul 2024 13:43)  T(F): 98.2 (25 Jul 2024 16:02), Max: 98.4 (25 Jul 2024 13:43)  HR: 93 (25 Jul 2024 16:02) (93 - 95)  BP: 106/67 (25 Jul 2024 18:37) (99/61 - 118/88)  BP(mean): --  RR: 18 (25 Jul 2024 16:02) (17 - 18)  SpO2: 98% (25 Jul 2024 16:02) (98% - 99%)    Parameters below as of 25 Jul 2024 16:02  Patient On (Oxygen Delivery Method): room air    -Labs : wbc 13.43 , hg 11.7 , plt 437 ,na 133, k4.3, crea 0.8   -Ua positive WBC93, RBC0, occ bacteria, moderate LE, neg nitrite   -transvaginal US neg for torsion  - CT A/P shows Right delayed nephrogram, marked perinephric inflammatory stranding with moderate hydronephrosis, minimal distention of renal pelvis, with right UPJ 2.2 x 1.3 x 1.3 cm calculus. Additional right renal pelvis staghorn calculus measuring 3.1 x 1.6 x 1 cm.  Findings compatible with obstructing calculus with right severe pyelonephritis. No evidence of abscess. Some imaging features raise the possibility of a chronic process, however the typical characteristics of xanthogranulomatous pyelonephritis are not appreciated at this time. Additional nonobstructing calculi within the right kidney, measuring up to 0.7 cm.      #right flank pain  #pyelonephritis  #kidney stones  #leukocytosis  -Labs : wbc 13.43 , hg 11.7 , plt 437 ,na 133, k4.3, crea 0.8   -UA positive WBC93, RBC0, occ bacteria, moderate LE, neg nitrite   -CT A/P shows Right delayed nephrogram, marked perinephric inflammatory stranding with moderate hydronephrosis, minimal distention of renal pelvis, with right UPJ 2.2 x 1.3 x 1.3 cm calculus. Additional right renal pelvis staghorn calculus measuring 3.1 x 1.6 x 1 cm.  Findings compatible with obstructing calculus with right severe pyelonephritis. No evidence of abscess. Some imaging features raise the possibility of a chronic process, however the typical characteristics of xanthogranulomatous pyelonephritis are not appreciated at this time. Additional nonobstructing calculi within the right kidney, measuring up to 0.7 cm.  -Urine culture: no growth  -if fever and chills take blood cultures   -IR: patient not a candidate for PCN due to inflammation  -Urology: PCNU placement will be done by IR prior to PCNL scheduled with Dr. Carter as outpatient.   -Patient will need to f/u outpatient with urology for definitive stone management once infection is resolved.   -retroperitoneal US shows fullness of the right renal collecting system but not hydronephrosis  -transvaginal US- cervical cysts  -urine shows no growth of acid fast bacilli will take 1 more sample today  -c/w Zofran IV push, Toradol IV push, Tylenol for nausea and pain  -ova parasite negative  - UCx acid fast negative  -CXR wnl  -strongyloides Ab positive  - D/C Ceftriaxone--> PO bactrim 1 DS BID to complete 14 days total of antibiotics end 8/7 as per ID  - Ivermectin 200 mcg/kg per day x two days as per ID  -pending QuantiFeron TB    #vaginitis  -received fluconazole yesterday  -started clotrimazole  -consulted gyn  -Vaginal swabs collected by GYN      #anemia  -mcv 81   -OP f/u       #DVT proph- none  #Diet- normal  Pending: anticipated for tomorrow 30 yo F with PMH/PSH of appendectomy non-smoker, denies daily alcohol use, presents with right flank pain for the past 2 days, persistent, radiates to the right side of her back, associated w/ fever , chills and urinary hesitancy, denies measured fever, n/v/d, anorexia, cough, respiratory sx, change in bowel habits or other urinary sx, vaginal d/c or other associated complaints at present.     Patient reports she had a syncopal episode 1 month ago and was taken to a [facility] who had told her she had 2 Right kidney stone. She had other complaints, like nausea, weakness. Patient reports she was told to follow-up with urology; however, did not make an appointment yet. She was not prescribed abx.       In ED,  -Vital Signs Last 24 Hrs  T(C): 36.8 (25 Jul 2024 16:02), Max: 36.9 (25 Jul 2024 13:43)  T(F): 98.2 (25 Jul 2024 16:02), Max: 98.4 (25 Jul 2024 13:43)  HR: 93 (25 Jul 2024 16:02) (93 - 95)  BP: 106/67 (25 Jul 2024 18:37) (99/61 - 118/88)  BP(mean): --  RR: 18 (25 Jul 2024 16:02) (17 - 18)  SpO2: 98% (25 Jul 2024 16:02) (98% - 99%)    Parameters below as of 25 Jul 2024 16:02  Patient On (Oxygen Delivery Method): room air    -Labs : wbc 13.43 , hg 11.7 , plt 437 ,na 133, k4.3, crea 0.8   -Ua positive WBC93, RBC0, occ bacteria, moderate LE, neg nitrite   -transvaginal US neg for torsion  - CT A/P shows Right delayed nephrogram, marked perinephric inflammatory stranding with moderate hydronephrosis, minimal distention of renal pelvis, with right UPJ 2.2 x 1.3 x 1.3 cm calculus. Additional right renal pelvis staghorn calculus measuring 3.1 x 1.6 x 1 cm.  Findings compatible with obstructing calculus with right severe pyelonephritis. No evidence of abscess. Some imaging features raise the possibility of a chronic process, however the typical characteristics of xanthogranulomatous pyelonephritis are not appreciated at this time. Additional nonobstructing calculi within the right kidney, measuring up to 0.7 cm.    Uro and IR were consulted and recommended OP FU and PCN placement OP.      #right flank pain  #pyelonephritis  #kidney stones  #leukocytosis  -Labs : wbc 13.43 , hg 11.7 , plt 437 ,na 133, k4.3, crea 0.8   -UA positive WBC93, RBC0, occ bacteria, moderate LE, neg nitrite   -CT A/P shows Right delayed nephrogram, marked perinephric inflammatory stranding with moderate hydronephrosis, minimal distention of renal pelvis, with right UPJ 2.2 x 1.3 x 1.3 cm calculus. Additional right renal pelvis staghorn calculus measuring 3.1 x 1.6 x 1 cm.  Findings compatible with obstructing calculus with right severe pyelonephritis. No evidence of abscess. Some imaging features raise the possibility of a chronic process, however the typical characteristics of xanthogranulomatous pyelonephritis are not appreciated at this time. Additional nonobstructing calculi within the right kidney, measuring up to 0.7 cm.  -Urine culture: no growth  -if fever and chills take blood cultures   -IR: patient not a candidate for PCN due to inflammation  -Urology: PCNU placement will be done by IR prior to PCNL scheduled with Dr. Carter as outpatient.   -Patient will need to f/u outpatient with urology for definitive stone management once infection is resolved.   -retroperitoneal US shows fullness of the right renal collecting system but not hydronephrosis  -transvaginal US- cervical cysts  -urine shows no growth of acid fast bacilli will take 1 more sample today  -c/w Zofran IV push, Toradol IV push, Tylenol for nausea and pain  -ova parasite negative  - UCx acid fast negative  -CXR wnl  -strongyloides Ab positive  - D/C Ceftriaxone--> PO bactrim 1 DS BID to complete 14 days total of antibiotics end 8/7 as per ID  - Ivermectin 200 mcg/kg per day x two days as per ID  -pending QuantiFeron TB    #vaginitis  -received fluconazole yesterday  -started clotrimazole  -consulted gyn  -Vaginal swabs collected by GYN      #anemia  -mcv 81   -OP f/u       #DVT proph- none  #Diet- normal  Pending: anticipated for tomorrow Medicine attending-    Patient seen and examined this morning   lying in bed  in nad  no complaints  being discharged to shelter today  spoke to patient via  and discussed all follows up upon discharge    Vital Signs Last 24 Hrs  T(C): 36.7 (02 Aug 2024 07:30), Max: 36.8 (01 Aug 2024 16:32)  T(F): 98.1 (02 Aug 2024 07:30), Max: 98.3 (01 Aug 2024 16:32)  HR: 86 (02 Aug 2024 07:30) (82 - 108)  BP: 95/63 (02 Aug 2024 07:30) (95/63 - 114/64)  BP(mean): --  RR: 18 (01 Aug 2024 23:59) (18 - 18)  SpO2: 97% (01 Aug 2024 16:32) (97% - 97%)    Parameters below as of 01 Aug 2024 16:32  Patient On (Oxygen Delivery Method): room air    28 yo F with PMH/PSH of appendectomy non-smoker, denies daily alcohol use, presents with right flank pain for the past 2 days, persistent, radiates to the right side of her back, associated w/ fever , chills and urinary hesitancy, denies measured fever, n/v/d, anorexia, cough, respiratory sx, change in bowel habits or other urinary sx, vaginal d/c or other associated complaints at present.     Patient reports she had a syncopal episode 1 month ago and was taken to a [facility] who had told her she had 2 Right kidney stone. She had other complaints, like nausea, weakness. Patient reports she was told to follow-up with urology; however, did not make an appointment yet. She was not prescribed abx.       In ED,  -Vital Signs Last 24 Hrs  T(C): 36.8 (25 Jul 2024 16:02), Max: 36.9 (25 Jul 2024 13:43)  T(F): 98.2 (25 Jul 2024 16:02), Max: 98.4 (25 Jul 2024 13:43)  HR: 93 (25 Jul 2024 16:02) (93 - 95)  BP: 106/67 (25 Jul 2024 18:37) (99/61 - 118/88)  BP(mean): --  RR: 18 (25 Jul 2024 16:02) (17 - 18)  SpO2: 98% (25 Jul 2024 16:02) (98% - 99%)    Parameters below as of 25 Jul 2024 16:02  Patient On (Oxygen Delivery Method): room air    -Labs : wbc 13.43 , hg 11.7 , plt 437 ,na 133, k4.3, crea 0.8   -Ua positive WBC93, RBC0, occ bacteria, moderate LE, neg nitrite   -transvaginal US neg for torsion  - CT A/P shows Right delayed nephrogram, marked perinephric inflammatory stranding with moderate hydronephrosis, minimal distention of renal pelvis, with right UPJ 2.2 x 1.3 x 1.3 cm calculus. Additional right renal pelvis staghorn calculus measuring 3.1 x 1.6 x 1 cm.  Findings compatible with obstructing calculus with right severe pyelonephritis. No evidence of abscess. Some imaging features raise the possibility of a chronic process, however the typical characteristics of xanthogranulomatous pyelonephritis are not appreciated at this time. Additional nonobstructing calculi within the right kidney, measuring up to 0.7 cm.    Uro and IR were consulted and recommended OP FU and PCN placement OP.      #right flank pain  #pyelonephritis  #kidney stones  #leukocytosis  -Labs : wbc 13.43 , hg 11.7 , plt 437 ,na 133, k4.3, crea 0.8   -UA positive WBC93, RBC0, occ bacteria, moderate LE, neg nitrite   -CT A/P shows Right delayed nephrogram, marked perinephric inflammatory stranding with moderate hydronephrosis, minimal distention of renal pelvis, with right UPJ 2.2 x 1.3 x 1.3 cm calculus. Additional right renal pelvis staghorn calculus measuring 3.1 x 1.6 x 1 cm.  Findings compatible with obstructing calculus with right severe pyelonephritis. No evidence of abscess. Some imaging features raise the possibility of a chronic process, however the typical characteristics of xanthogranulomatous pyelonephritis are not appreciated at this time. Additional nonobstructing calculi within the right kidney, measuring up to 0.7 cm.  -Urine culture: no growth  -if fever and chills take blood cultures   -IR: patient not a candidate for PCN due to inflammation  -Urology: PCNU placement will be done by IR prior to PCNL scheduled with Dr. Carter as outpatient.   -Patient will need to f/u outpatient with urology for definitive stone management once infection is resolved.   -retroperitoneal US shows fullness of the right renal collecting system but not hydronephrosis  -transvaginal US- cervical cysts  -urine shows no growth of acid fast bacilli will take 1 more sample today  -c/w Zofran IV push, Toradol IV push, Tylenol for nausea and pain  -ova parasite negative  - UCx acid fast negative  -CXR wnl  -strongyloides Ab positive  - D/C Ceftriaxone--> PO bactrim 1 DS BID to complete 14 days total of antibiotics end 8/7 as per ID  - Ivermectin 200 mcg/kg per day x two days as per ID  -QuantiFeron TB neg    #vaginitis  -received fluconazole yesterday  -started clotrimazole - cream sent to [facility]   -consulted gyn  -Vaginal swabs collected by GYN    D/C today; d/c planning took over 75 min  d/c papers edited by me  discussed d/c plan and all instructions in detail

## 2024-08-01 NOTE — PROGRESS NOTE ADULT - ASSESSMENT
28 yo F with PMH/PSH of appendectomy non-smoker, denies daily alcohol use, presents with right flank pain for the past 2 days.     #right flank pain  #pyelonephritis  #kidney stones  #leukocytosis  -Labs : wbc 13.43 , hg 11.7 , plt 437 ,na 133, k4.3, crea 0.8   -UA positive WBC93, RBC0, occ bacteria, moderate LE, neg nitrite   -CT A/P shows Right delayed nephrogram, marked perinephric inflammatory stranding with moderate hydronephrosis, minimal distention of renal pelvis, with right UPJ 2.2 x 1.3 x 1.3 cm calculus. Additional right renal pelvis staghorn calculus measuring 3.1 x 1.6 x 1 cm.  Findings compatible with obstructing calculus with right severe pyelonephritis. No evidence of abscess. Some imaging features raise the possibility of a chronic process, however the typical characteristics of xanthogranulomatous pyelonephritis are not appreciated at this time. Additional nonobstructing calculi within the right kidney, measuring up to 0.7 cm.  -Urine culture: no growth  -if fever and chills take blood cultures   -IR: patient not a candidate for PCN due to inflammation  -Urology: PCNU placement will be done by IR prior to PCNL scheduled with Dr. Carter as outpatient.   -Patient will need to f/u outpatient with urology for definitive stone management once infection is resolved.   -retroperitoneal US shows fullness of the right renal collecting system but not hydronephrosis  -transvaginal US- cervical cysts  -urine shows no growth of acid fast bacilli will take 1 more sample today  -c/w Zofran IV push, Toradol IV push, Tylenol for nausea and pain  -ova parasite negative  - UCx acid fast negative  -CXR wnl  -strongyloides Ab positive  - D/C Ceftriaxone--> PO bactrim 1 DS BID to complete 14 days total of antibiotics end 8/7 as per ID  - Ivermectin 200 mcg/kg per day x two days as per ID  -pending QuantiFeron TB      #anemia  -mcv 81   -OP f/u       #DVT proph- none  #Diet- normal  Pending: anticipated for tomorrow 28 yo F with PMH/PSH of appendectomy non-smoker, denies daily alcohol use, presents with right flank pain for the past 2 days.     #right flank pain  #pyelonephritis  #kidney stones  #leukocytosis  -Labs : wbc 13.43 , hg 11.7 , plt 437 ,na 133, k4.3, crea 0.8   -UA positive WBC93, RBC0, occ bacteria, moderate LE, neg nitrite   -CT A/P shows Right delayed nephrogram, marked perinephric inflammatory stranding with moderate hydronephrosis, minimal distention of renal pelvis, with right UPJ 2.2 x 1.3 x 1.3 cm calculus. Additional right renal pelvis staghorn calculus measuring 3.1 x 1.6 x 1 cm.  Findings compatible with obstructing calculus with right severe pyelonephritis. No evidence of abscess. Some imaging features raise the possibility of a chronic process, however the typical characteristics of xanthogranulomatous pyelonephritis are not appreciated at this time. Additional nonobstructing calculi within the right kidney, measuring up to 0.7 cm.  -Urine culture: no growth  -if fever and chills take blood cultures   -IR: patient not a candidate for PCN due to inflammation  -Urology: PCNU placement will be done by IR prior to PCNL scheduled with Dr. Carter as outpatient.   -Patient will need to f/u outpatient with urology for definitive stone management once infection is resolved.   -retroperitoneal US shows fullness of the right renal collecting system but not hydronephrosis  -transvaginal US- cervical cysts  -urine shows no growth of acid fast bacilli will take 1 more sample today  -c/w Zofran IV push, Toradol IV push, Tylenol for nausea and pain  -ova parasite negative  - UCx acid fast negative  -CXR wnl  -strongyloides Ab positive  - D/C Ceftriaxone--> PO bactrim 1 DS BID to complete 14 days total of antibiotics end 8/7 as per ID  - Ivermectin 200 mcg/kg per day x two days as per ID  -pending QuantiFeron TB    #vaginitis  -received fluconazole yesterday  -started clotrimazole  -consulted gyn  -Vaginal swabs collected by GYN      #anemia  -mcv 81   -OP f/u       #DVT proph- none  #Diet- normal  Pending: anticipated for tomorrow

## 2024-08-01 NOTE — PROGRESS NOTE ADULT - SUBJECTIVE AND OBJECTIVE BOX
VON CORNEJO  29y  Female      Patient is a 29y old  Female who presents with a chief complaint of Urinary tract infection     (28 Jul 2024 13:49)      INTERVAL HPI/OVERNIGHT EVENTS:  Patient seen and examined earlier this morning  sitting in bed  in nad  complains of mild Right CVA tenderness with palpation   spoke to patient via  with house staff     T(C): 36.7 (08-01-24 @ 07:30), Max: 37.2 (08-01-24 @ 00:00)  HR: 78 (08-01-24 @ 07:30) (77 - 78)  BP: 96/57 (08-01-24 @ 07:30) (96/57 - 101/69)  RR: 18 (08-01-24 @ 00:00) (18 - 18)  SpO2: --    PHYSICAL EXAM:  GENERAL: NAD, well-groomed,   HEAD:  Atraumatic, Normocephalic  EYES: conjunctiva and sclera clear  ENMT: Moist mucous membranes,  No visible lesions  NECK: Supple, No JVD, Normal thyroid  NERVOUS SYSTEM:  Alert & Oriented X3, Good concentration; moves all extremities   CHEST/LUNG: good air entry   HEART: Regular rate and rhythm; No murmurs, rubs, or gallops  ABDOMEN: Soft, Nontender, Nondistended; Bowel sounds present, mild right CVA ttp  EXTREMITIES:  2+ Peripheral Pulses, No clubbing, cyanosis, or edema  LYMPH: No lymphadenopathy noted  SKIN: multiple tattoos     Consultant(s) Notes Reviewed:  [x ] YES  [ ] NO  Care Discussed with Consultants/Other Providers [ x] YES  [ ] NO    LAB:                        11.2   13.08 )-----------( 469      ( 01 Aug 2024 07:14 )             35.8     08-01    134<L>  |  97<L>  |  17  ----------------------------<  72  4.6   |  26  |  1.0    Ca    8.7      01 Aug 2024 07:14  Mg     2.1     08-01    TPro  8.2<H>  /  Alb  4.1  /  TBili  0.4  /  DBili  x   /  AST  20  /  ALT  22  /  AlkPhos  104  08-01    LIVER FUNCTIONS - ( 01 Aug 2024 07:14 )  Alb: 4.1 g/dL / Pro: 8.2 g/dL / ALK PHOS: 104 U/L / ALT: 22 U/L / AST: 20 U/L / GGT: x           Drug Dosing Weight  Height (cm): 159 (28 Jul 2024 13:49)  Weight (kg): 51 (25 Jul 2024 13:43)  BMI (kg/m2): 20.2 (28 Jul 2024 13:49)  BSA (m2): 1.51 (28 Jul 2024 13:49)      Urinalysis Basic - ( 01 Aug 2024 07:14 )    Color: x / Appearance: x / SG: x / pH: x  Gluc: 72 mg/dL / Ketone: x  / Bili: x / Urobili: x   Blood: x / Protein: x / Nitrite: x   Leuk Esterase: x / RBC: x / WBC x   Sq Epi: x / Non Sq Epi: x / Bacteria: x        RADIOLOGY & ADDITIONAL TESTS:  Imaging Personally Reviewed:  [x] YES  [ ] NO      MEDS:  acetaminophen     Tablet .. 650 milliGRAM(s) Oral every 6 hours PRN  aluminum hydroxide/magnesium hydroxide/simethicone Suspension 30 milliLiter(s) Oral every 4 hours PRN  cefTRIAXone   IVPB 2000 milliGRAM(s) IV Intermittent every 24 hours  chlorhexidine 2% Cloths 1 Application(s) Topical <User Schedule>  clotrimazole 2% Vaginal Cream 1 Applicatorful Vaginal daily  lactobacillus acidophilus 1 Tablet(s) Oral two times a day with meals  melatonin 3 milliGRAM(s) Oral at bedtime PRN  ondansetron Injectable 4 milliGRAM(s) IV Push every 8 hours PRN  pantoprazole    Tablet 40 milliGRAM(s) Oral before breakfast  polyethylene glycol 3350 17 Gram(s) Oral two times a day  senna 2 Tablet(s) Oral at bedtime

## 2024-08-01 NOTE — CONSULT NOTE ADULT - CONSULT REASON
vaginal itching
percutaneous nephrostomy staghorn calculus
Right staghorn calculi with pyelonephritis
pyelonephritis

## 2024-08-01 NOTE — CONSULT NOTE ADULT - ASSESSMENT
28yo G0, LMP early June with vaginal itching and white discharge, likely candida infection, clinically and hemodynamically stable.    -Can offer Terconazole 4% x 7 days  -Vaginitis collected, GYN to follow    Rest of care per primary team    Discussed with Dr. Shultz and Dr. Rondon

## 2024-08-01 NOTE — DISCHARGE NOTE PROVIDER - NSDCCPCAREPLAN_GEN_ALL_CORE_FT
PRINCIPAL DISCHARGE DIAGNOSIS  Diagnosis: UTI (urinary tract infection)  Assessment and Plan of Treatment: You were noted either on arrival or during this hospitalization to have a Urinary Tract Infection. You may have already been treated and completed the antibiotics, please refer to the list of medications present on your discharge paperwork. If you notice that there are antibiotics listed, these may be to treat your infection, be sure to complete taking the full course, whether you have symptoms or not, as prescribed.  While taking antibiotics, you may benefit from taking a probiotic such as florastore to help to try and prevent an infectious type of diarrhea known as C Diff. If you notice that you begin having severe watery diarrhea, more than 4-5 episodes a day, please see your Primary Care Doctor or come to the ER to have your stool tested for this infection.   It is not necessary to repeat a urine test to see if the infection is gone, it is assumed that after treatment it should have resolved. However, if you continue to have symptoms, please see your Primary Care doctor or return to the ER.        SECONDARY DISCHARGE DIAGNOSES  Diagnosis: Staghorn calculus  Assessment and Plan of Treatment:     Diagnosis: UTI (urinary tract infection)  Assessment and Plan of Treatment:      PRINCIPAL DISCHARGE DIAGNOSIS  Diagnosis: UTI (urinary tract infection)  Assessment and Plan of Treatment: You were noted either on arrival or during this hospitalization to have a Urinary Tract Infection. You may have already been treated and completed the antibiotics, please refer to the list of medications present on your discharge paperwork. If you notice that there are antibiotics listed, these may be to treat your infection, be sure to complete taking the full course, whether you have symptoms or not, as prescribed.  While taking antibiotics, you may benefit from taking a probiotic such as florastore to help to try and prevent an infectious type of diarrhea known as C Diff. If you notice that you begin having severe watery diarrhea, more than 4-5 episodes a day, please see your Primary Care Doctor or come to the ER to have your stool tested for this infection.   It is not necessary to repeat a urine test to see if the infection is gone, it is assumed that after treatment it should have resolved. However, if you continue to have symptoms, please see your Primary Care doctor or return to the ER.        SECONDARY DISCHARGE DIAGNOSES  Diagnosis: Staghorn calculus  Assessment and Plan of Treatment: Kidney Stones  Kidney stones (urolithiasis) are crystal deposits that form inside your kidneys. Pain is caused by the stone moving through the urinary tract, causing spasms of the ureter. Drink enough water and fluids to keep your urine clear or pale yellow. This will help you to pass the stone or stone fragments. If provided a strainer, strain all urine and keep all particulate matter and stones for a follow up appointment with a urologist.  SEEK IMMEDIATE MEDICAL CARE IF YOU HAVE ANY OF THE FOLLOWING SYMPTOMS: pain not controlled with medication, fever/chills, worsening vomiting, inability to urinate, or dizziness/lightheadedness.      Diagnosis: UTI (urinary tract infection)  Assessment and Plan of Treatment:      PRINCIPAL DISCHARGE DIAGNOSIS  Diagnosis: UTI (urinary tract infection)  Assessment and Plan of Treatment: You were noted either on arrival or during this hospitalization to have a Urinary Tract Infection. You may have already been treated and completed the antibiotics, please refer to the list of medications present on your discharge paperwork. If you notice that there are antibiotics listed, these may be to treat your infection, be sure to complete taking the full course, whether you have symptoms or not, as prescribed.  While taking antibiotics, you may benefit from taking a probiotic such as florastore to help to try and prevent an infectious type of diarrhea known as C Diff. If you notice that you begin having severe watery diarrhea, more than 4-5 episodes a day, please see your Primary Care Doctor or come to the ER to have your stool tested for this infection.   It is not necessary to repeat a urine test to see if the infection is gone, it is assumed that after treatment it should have resolved. However, if you continue to have symptoms, please see your Primary Care doctor or return to the ER.        SECONDARY DISCHARGE DIAGNOSES  Diagnosis: Staghorn calculus  Assessment and Plan of Treatment: Kidney Stones  Kidney stones (urolithiasis) are crystal deposits that form inside your kidneys. Pain is caused by the stone moving through the urinary tract, causing spasms of the ureter. Drink enough water and fluids to keep your urine clear or pale yellow. This will help you to pass the stone or stone fragments. If provided a strainer, strain all urine and keep all particulate matter and stones for a follow up appointment with a urologist. You will be given information on how to follow up with interventional radiology and urology once you complete your antibiotics   SEEK IMMEDIATE MEDICAL CARE IF YOU HAVE ANY OF THE FOLLOWING SYMPTOMS: pain not controlled with medication, fever/chills, worsening vomiting, inability to urinate, or dizziness/lightheadedness.

## 2024-08-01 NOTE — DISCHARGE NOTE PROVIDER - CARE PROVIDER_API CALL
Raul Bojorquez  Internal Medicine  [address] [place] [address]  Phone: [phone]  Fax: [phone]  Scheduled Appointment: 08/05/2024 02:00 PM   Raul Bojorquez  Internal Medicine  [address] [place] [address]  Phone: [phone]  Fax: [phone]  Follow Up Time: 1 week    Walter Carter  Urology  [address] [place] [address]  Phone: [phone]  Fax: [phone]  Follow Up Time: 2 weeks    Baljeet Velez Asa  Interventional Radiology and Diagnostic Radiology  [address] [place] [address]  Phone: [phone]  Fax: [phone]  Follow Up Time: 2 weeks   Raul Bojorquez  Internal Medicine  [address] [place] [address]  Phone: [phone]  Fax: [phone]  Scheduled Appointment: 08/05/2024 02:00 PM    Walter Carter  Urology  [address] [place] [address]  Phone: [phone]  Fax: [phone]  Follow Up Time: 2 weeks    Baljeet Velez Asa  Interventional Radiology and Diagnostic Radiology  [address] [place] [address]  Phone: [phone]  Fax: [phone]  Follow Up Time: 2 weeks    Kirby Adams  Obstetrics and Gynecology  [address] [place] [address]  Phone: [phone]  Fax: [phone]  Follow Up Time: 1 week    Sb Dickens.  Infectious Disease  [address] [place] [address]  Phone: [phone]  Fax: [phone]  Follow Up Time: 1 week

## 2024-08-01 NOTE — DISCHARGE NOTE PROVIDER - NPI NUMBER (FOR SYSADMIN USE ONLY) :
[[id number]] [[id number]],[[id number]],[[id number]] [[id number]],[[id number]],[[id number]],[[id number]],[[id number]]

## 2024-08-01 NOTE — DISCHARGE NOTE PROVIDER - NSDCFUSCHEDAPPT_GEN_ALL_CORE_FT
Raul Bojorquez  [facility] PreAdmits  Scheduled Appointment: 08/05/2024    [facility]  [facility]   Scheduled Appointment: 08/05/2024

## 2024-08-01 NOTE — PROVIDER CONTACT NOTE (OTHER) - BACKGROUND
Pt had one episode of BRBPR while having a BM. Small amount of blood noted. Pt denies dizziness, abdominal pain at this time. Pt states she had a similar episode prior to coming into the hospital.

## 2024-08-01 NOTE — DISCHARGE NOTE PROVIDER - PROVIDER TOKENS
PROVIDER:[TOKEN:[[id number]],SCHEDULEDAPPT:[08/05/2024],SCHEDULEDAPPTTIME:[02:00 PM]] PROVIDER:[TOKEN:[[id number]],FOLLOWUP:[1 week],SCHEDULEDAPPTTIME:[02:00 PM]],PROVIDER:[TOKEN:[[id number]],FOLLOWUP:[2 weeks]],PROVIDER:[TOKEN:[[id number]],FOLLOWUP:[2 weeks]] PROVIDER:[TOKEN:[[id number]],SCHEDULEDAPPT:[08/05/2024],SCHEDULEDAPPTTIME:[02:00 PM]],PROVIDER:[TOKEN:[[id number]],FOLLOWUP:[2 weeks]],PROVIDER:[TOKEN:[[id number]],FOLLOWUP:[2 weeks]],PROVIDER:[TOKEN:[[id number]],FOLLOWUP:[1 week]],PROVIDER:[TOKEN:[[id number]],FOLLOWUP:[1 week]]

## 2024-08-01 NOTE — DISCHARGE NOTE PROVIDER - CARE PROVIDERS DIRECT ADDRESSES
,[email or web address]

## 2024-08-01 NOTE — CONSULT NOTE ADULT - SUBJECTIVE AND OBJECTIVE BOX
Chief Complaint: vaginal itching     HPI: 30 yo G0, LMP early June, with PMH/PSH of appendectomy, admitted for tx of pyelonephritis and ureterolithiasis GYN consulted for 4 days of vaginal itching and white discharge. She reports taking diflucan 1 day ago without relief.      Denies abnormal discharge otherwise, foul smelling odor, vaginal bedding, and pelvic pain.   Ob/Gyn History:  G0                 LMP early june               Denies history of ovarian cysts, uterine fibroids, abnormal paps, or STIs    Denies the following: constitutional symptoms, visual symptoms, cardiovascular symptoms, respiratory symptoms, GI symptoms, musculoskeletal symptoms, skin symptoms, neurologic symptoms, hematologic symptoms, allergic symptoms, psychiatric symptoms  Except any pertinent positives listed.     Home Medications: none  Allergies  No Known Allergies    PAST MEDICAL & SURGICAL HISTORY:  No pertinent past medical history  History of appendectomy    FAMILY HISTORY: none    SOCIAL HISTORY: Denies cigarette use, alcohol use, or illicit drug use    Vital Signs Last 24 Hrs  T(F): 98 (01 Aug 2024 07:30), Max: 99 (01 Aug 2024 00:00)  HR: 78 (01 Aug 2024 07:30) (77 - 87)  BP: 96/57 (01 Aug 2024 07:30) (96/57 - 101/69)  RR: 18 (01 Aug 2024 00:00) (18 - 18)    General Appearance - AAOx3, NAD  Abdomen - Soft, nontender, nondistended, no rebound, no rigidity, no guarding    GYN/Pelvis:  Labia Majora - Normal  Labia Minora - Normal  Clitoris - Normal  Urethra - Normal  Vagina - Normal, moderate amount of white discharge in vault  Cervix - Normal, c/l/p    Uterus:  Size - Normal  Tenderness - None  Mass - None  Freely mobile    Adnexa:  Masses - None  Tenderness - None      Meds:   (ADM OVERRIDE) 1 each &lt;see task&gt; GiveOnce  cefTRIAXone   IVPB 2000 milliGRAM(s) IV Intermittent once  ketorolac   Injectable 15 milliGRAM(s) IV Push every 8 hours PRN  ketorolac   Injectable 15 milliGRAM(s) IV Push once  lactated ringers Bolus 1000 milliLiter(s) IV Bolus once  ondansetron Injectable 4 milliGRAM(s) IV Push once      LABS:                        11.2   13.08 )-----------( 469      ( 01 Aug 2024 07:14 )             35.8         08-01    134<L>  |  97<L>  |  17  ----------------------------<  72  4.6   |  26  |  1.0    Ca    8.7      01 Aug 2024 07:14  Mg     2.1     08-01    TPro  8.2<H>  /  Alb  4.1  /  TBili  0.4  /  DBili  x   /  AST  20  /  ALT  22  /  AlkPhos  104  08-01      Urinalysis Basic - ( 01 Aug 2024 07:14 )    Color: x / Appearance: x / SG: x / pH: x  Gluc: 72 mg/dL / Ketone: x  / Bili: x / Urobili: x   Blood: x / Protein: x / Nitrite: x   Leuk Esterase: x / RBC: x / WBC x   Sq Epi: x / Non Sq Epi: x / Bacteria: x        Culture - Urine (collected 07-25-24 @ 14:57)  Source: Clean Catch None  Final Report (07-26-24 @ 18:01):    No growth

## 2024-08-01 NOTE — PROGRESS NOTE ADULT - SUBJECTIVE AND OBJECTIVE BOX
SUBJECTIVE/OVERNIGHT EVENTS  Today is hospital day 7d. This morning patient was seen and examined at bedside, resting comfortably in bed. No acute or major events overnight. This morning pt c/o of right sided abdominal/back pain and nausea.    MEDICATIONS  STANDING MEDICATIONS  chlorhexidine 2% Cloths 1 Application(s) Topical <User Schedule>  clotrimazole 2% Vaginal Cream 1 Applicatorful Vaginal daily  ivermectin 10.5 milliGRAM(s) Oral daily  lactobacillus acidophilus 1 Tablet(s) Oral two times a day with meals  pantoprazole    Tablet 40 milliGRAM(s) Oral before breakfast  polyethylene glycol 3350 17 Gram(s) Oral two times a day  senna 2 Tablet(s) Oral at bedtime  trimethoprim  160 mG/sulfamethoxazole 800 mG 1 Tablet(s) Oral every 12 hours    PRN MEDICATIONS  acetaminophen     Tablet .. 650 milliGRAM(s) Oral every 6 hours PRN  aluminum hydroxide/magnesium hydroxide/simethicone Suspension 30 milliLiter(s) Oral every 4 hours PRN  melatonin 3 milliGRAM(s) Oral at bedtime PRN  ondansetron Injectable 4 milliGRAM(s) IV Push every 8 hours PRN    VITALS  T(F): 98.3 (08-01-24 @ 16:32), Max: 99 (08-01-24 @ 00:00)  HR: 96 (08-01-24 @ 17:32) (77 - 108)  BP: 102/69 (08-01-24 @ 17:32) (96/57 - 102/69)  RR: 18 (08-01-24 @ 16:32) (18 - 18)  SpO2: 97% (08-01-24 @ 16:32) (97% - 97%)    PHYSICAL EXAM  GENERAL  NAD, lying in bed comfortably       ABDOMEN  Right sided lower abdominal and CVA tenderness          LABS             11.2   13.08 )-----------( 469      ( 08-01-24 @ 07:14 )             35.8     134  |  97  |  17  -------------------------<  72   08-01-24 @ 07:14  4.6  |  26  |  1.0    Ca      8.7     08-01-24 @ 07:14  Mg     2.1     08-01-24 @ 07:14    TPro  8.2  /  Alb  4.1  /  TBili  0.4  /  DBili  x   /  AST  20  /  ALT  22  /  AlkPhos  104  /  GGT  x     08-01-24 @ 07:14        Urinalysis Basic - ( 01 Aug 2024 07:14 )    Color: x / Appearance: x / SG: x / pH: x  Gluc: 72 mg/dL / Ketone: x  / Bili: x / Urobili: x   Blood: x / Protein: x / Nitrite: x   Leuk Esterase: x / RBC: x / WBC x   Sq Epi: x / Non Sq Epi: x / Bacteria: x          Culture - Acid Fast - Urine (collected 30 Jul 2024 12:15)  Source: .Urine Urine      IMAGING  < from: US Renal (08.01.24 @ 11:59) >    ACC: [id number] EXAM:  US KIDNEY(S)   ORDERED BY: ORI MARINA     PROCEDURE DATE:  08/01/2024          INTERPRETATION:  CLINICAL INFORMATION: Flank pain    COMPARISON: Sonogram dated 7/28/2024    TECHNIQUE: Sonography of the kidneys and bladder.    FINDINGS/  IMPRESSION:    Right kidney: 11.9 cm. Numerous renal calculi including calculus in UPJ,   better seen on recent CT-limited evaluation by ultrasound due to   extensive shadowing. Mild hydronephrosis.    Left kidney: 10.8 cm. No hydronephrosis.    Urinary bladder: Contracted precluding assessment.    --- End of Report ---    < end of copied text >   SUBJECTIVE/OVERNIGHT EVENTS  Today is hospital day 7d. This morning patient was seen and examined at bedside, resting comfortably in bed. No acute or major events overnight. This morning pt c/o of right sided abdominal/back pain and nausea. Also c/o burning pain and itching in vaginal area.    MEDICATIONS  STANDING MEDICATIONS  chlorhexidine 2% Cloths 1 Application(s) Topical <User Schedule>  clotrimazole 2% Vaginal Cream 1 Applicatorful Vaginal daily  ivermectin 10.5 milliGRAM(s) Oral daily  lactobacillus acidophilus 1 Tablet(s) Oral two times a day with meals  pantoprazole    Tablet 40 milliGRAM(s) Oral before breakfast  polyethylene glycol 3350 17 Gram(s) Oral two times a day  senna 2 Tablet(s) Oral at bedtime  trimethoprim  160 mG/sulfamethoxazole 800 mG 1 Tablet(s) Oral every 12 hours    PRN MEDICATIONS  acetaminophen     Tablet .. 650 milliGRAM(s) Oral every 6 hours PRN  aluminum hydroxide/magnesium hydroxide/simethicone Suspension 30 milliLiter(s) Oral every 4 hours PRN  melatonin 3 milliGRAM(s) Oral at bedtime PRN  ondansetron Injectable 4 milliGRAM(s) IV Push every 8 hours PRN    VITALS  T(F): 98.3 (08-01-24 @ 16:32), Max: 99 (08-01-24 @ 00:00)  HR: 96 (08-01-24 @ 17:32) (77 - 108)  BP: 102/69 (08-01-24 @ 17:32) (96/57 - 102/69)  RR: 18 (08-01-24 @ 16:32) (18 - 18)  SpO2: 97% (08-01-24 @ 16:32) (97% - 97%)    PHYSICAL EXAM  GENERAL  NAD, lying in bed comfortably       ABDOMEN  Right sided lower abdominal and CVA tenderness          LABS             11.2   13.08 )-----------( 469      ( 08-01-24 @ 07:14 )             35.8     134  |  97  |  17  -------------------------<  72   08-01-24 @ 07:14  4.6  |  26  |  1.0    Ca      8.7     08-01-24 @ 07:14  Mg     2.1     08-01-24 @ 07:14    TPro  8.2  /  Alb  4.1  /  TBili  0.4  /  DBili  x   /  AST  20  /  ALT  22  /  AlkPhos  104  /  GGT  x     08-01-24 @ 07:14        Urinalysis Basic - ( 01 Aug 2024 07:14 )    Color: x / Appearance: x / SG: x / pH: x  Gluc: 72 mg/dL / Ketone: x  / Bili: x / Urobili: x   Blood: x / Protein: x / Nitrite: x   Leuk Esterase: x / RBC: x / WBC x   Sq Epi: x / Non Sq Epi: x / Bacteria: x          Culture - Acid Fast - Urine (collected 30 Jul 2024 12:15)  Source: .Urine Urine      IMAGING  < from: US Renal (08.01.24 @ 11:59) >    ACC: [id number] EXAM:  US KIDNEY(S)   ORDERED BY: ORI MARINA     PROCEDURE DATE:  08/01/2024          INTERPRETATION:  CLINICAL INFORMATION: Flank pain    COMPARISON: Sonogram dated 7/28/2024    TECHNIQUE: Sonography of the kidneys and bladder.    FINDINGS/  IMPRESSION:    Right kidney: 11.9 cm. Numerous renal calculi including calculus in UPJ,   better seen on recent CT-limited evaluation by ultrasound due to   extensive shadowing. Mild hydronephrosis.    Left kidney: 10.8 cm. No hydronephrosis.    Urinary bladder: Contracted precluding assessment.    --- End of Report ---    < end of copied text >

## 2024-08-01 NOTE — CONSULT NOTE ADULT - CONSULT REQUESTED DATE/TIME
26-Jul-2024 08:46
Subjective:       Patient ID: Shashi Hassan is a 88 y.o. White male who presents for follow-up evaluation of Chronic Kidney Disease    HPI       He is doing well except ongoping constipation  No CP nor SOB. He denies foamy urine, no hematuria and no flank pain. He follows a low sodium diet and feels he stays hydrated. No LE edema and no SOB. No NSAID use and no herbal medications.     Review of Systems   Constitutional: Negative for activity change, appetite change, fatigue and unexpected weight change.   HENT: Negative for facial swelling.    Respiratory: Negative for cough and shortness of breath.    Cardiovascular: Negative for chest pain and leg swelling.   Gastrointestinal: Negative for abdominal distention.   Genitourinary: Negative for difficulty urinating, dysuria, frequency, hematuria and urgency.   Musculoskeletal: Negative for arthralgias.   Neurological: Negative for weakness and headaches.   Hematological: Does not bruise/bleed easily.   Psychiatric/Behavioral: Negative for decreased concentration.       Objective:      Physical Exam   Constitutional: He is oriented to person, place, and time. He appears well-developed and well-nourished. No distress.   Neck: No JVD present.   Cardiovascular: S1 normal and S2 normal. Exam reveals no friction rub.   Pulmonary/Chest: He has no wheezes. He has no rales.   Abdominal: Soft.   Musculoskeletal: He exhibits no edema.   Neurological: He is alert and oriented to person, place, and time.   Skin: Skin is warm and dry.   Psychiatric: He has a normal mood and affect.   Nursing note and vitals reviewed.      Assessment:       1. CKD (chronic kidney disease) stage 3, GFR 30-59 ml/min    2. CKD (chronic kidney disease), stage III    3. Obesity (BMI 30.0-34.9)    4. Benign essential hypertension    5. S/P CABG x 3    6. Mixed hyperlipidemia    7. Benign hypertensive heart disease without heart failure    8. Coronary arteriosclerosis in native artery        Plan:        
     CKD Stage 3 with stable kidney function.  Continue RAAS blockade (sprinolactone) for renal preservation    BP low but he denies frequent orthostasis. Discussed if occurs more than a few time per months he should contact myself or PCP    Mineral and Bone Disease--PTH and D level at goal. Calcium is fine off calcium supplements        RTC 6 mo     
01-Aug-2024 13:49
25-Jul-2024 16:53
26-Jul-2024 09:13

## 2024-08-01 NOTE — PROGRESS NOTE ADULT - ASSESSMENT
30 yo F with PMH/PSH of appendectomy non-smoker, denies daily alcohol use, presents with right flank pain for the past 2 days.     #Right pyelonephritis  #kidney stones  #leukocytosis  #strongyloides  #candida infection     -ID following - PO bactrim until 8/7  -Ivermectin 200 mcg/kg per day x two days  -Seen by GYN - concern for candidal infection, recommended - Terconazole 4% x 7 days  -CT A/P shows Right delayed nephrogram, marked perinephric inflammatory stranding with moderate hydronephrosis, minimal distention of renal pelvis, with right UPJ 2.2 x 1.3 x 1.3 cm calculus. Additional right renal pelvis staghorn calculus measuring 3.1 x 1.6 x 1 cm.  Findings compatible with obstructing calculus with right severe pyelonephritis. No evidence of abscess. Some imaging features raise the possibility of a chronic process, however the typical characteristics of xanthogranulomatous pyelonephritis are not appreciated at this time. Additional nonobstructing calculi within the right kidney, measuring up to 0.7 cm.  -IR: patient not a candidate for PCN due to inflammation  -Urology: PCNU placement will be done by IR prior to PCNL scheduled with Dr. Carter as outpatient.   -Patient will need to f/u outpatient with urology for definitive stone management once infection is resolved.   -retroperitoneal US shows fullness of the right renal collecting system but not hydronephrosis  -transvaginal US- cervical cysts  -repeat bladder/renal sono - right kidney - mild hydro   -pending QuantiFeron TB  -pending UCx acid fast    #anemia  #thrombocytosis  - outpatient follow up; labs stable    #lack of housing  -patient will be going to a shelter   -social work following  -medicine clinic liane obtained by me - 8/5 2pm dr rodriguez    Progress Note Handoff  Pending Consults: none  Pending Tests: labs  Pending Results: labs, cultures  Family Discussion: Discussed labs, meds, cultures, sonogram and overall plan of care with pt and medical staff. PFD in am   Disposition: Home____/SNF______/Other_shelter____/Unknown at this time_____  Spent over 55 min reviewing chart, speaking with patient/family and on coordinating patient care during interdisciplinary rounds     Please call me with any questions at extension [phone]

## 2024-08-01 NOTE — DISCHARGE NOTE PROVIDER - NSDCMRMEDTOKEN_GEN_ALL_CORE_FT
Bactrim  mg-160 mg oral tablet: 1 tab(s) orally 2 times a day LAST DOSES 8/7/24  terconazole 0.4% vaginal cream: 1 applicatorful intravaginally once a day

## 2024-08-01 NOTE — PROGRESS NOTE ADULT - SUBJECTIVE AND OBJECTIVE BOX
CLEMENTE VON  29y, Female  Allergy: No Known Allergies      LOS  7d    CHIEF COMPLAINT: Urinary tract infection     (28 Jul 2024 13:49)      INTERVAL EVENTS/HPI  - No acute events overnight, + strongyloides  - T(F): , Max: 99 (08-01-24 @ 00:00)  - Denies any worsening symptoms  - Tolerating medication  - WBC Count: 13.08 (08-01-24 @ 07:14)  WBC Count: 11.51 (07-31-24 @ 07:11)     - Creatinine: 1.0 (08-01-24 @ 07:14)  Creatinine: 1.0 (07-31-24 @ 07:11)       ROS  General: Denies rigors, nightsweats  HEENT: Denies headache, rhinorrhea, sore throat, eye pain  CV: Denies CP, palpitations  PULM: Denies wheezing, hemoptysis  GI: Denies hematemesis, hematochezia, melena  : Denies discharge, hematuria  MSK: Denies arthralgias, myalgias  SKIN: Denies rash, lesions  NEURO: Denies paresthesias, weakness  PSYCH: Denies depression, anxiety    VITALS:  T(F): 98, Max: 99 (08-01-24 @ 00:00)  HR: 78  BP: 96/57  RR: 18Vital Signs Last 24 Hrs  T(C): 36.7 (01 Aug 2024 07:30), Max: 37.2 (01 Aug 2024 00:00)  T(F): 98 (01 Aug 2024 07:30), Max: 99 (01 Aug 2024 00:00)  HR: 78 (01 Aug 2024 07:30) (77 - 87)  BP: 96/57 (01 Aug 2024 07:30) (96/57 - 101/69)  BP(mean): --  RR: 18 (01 Aug 2024 00:00) (18 - 18)  SpO2: 97% (31 Jul 2024 15:00) (97% - 97%)    Parameters below as of 31 Jul 2024 15:00  Patient On (Oxygen Delivery Method): room air        PHYSICAL EXAM:  Gen: NAD, resting in bed  HEENT: Normocephalic, atraumatic  Neck: supple, no lymphadenopathy  CV: Regular rate & regular rhythm  Lungs: decreased BS at bases, no fremitus  Abdomen: Soft, BS present  Ext: Warm, well perfused  Neuro: non focal, awake  Skin: no rash, no erythema  Lines: no phlebitis    FH: Non-contributory  Social Hx: Non-contributory    TESTS & MEASUREMENTS:                        11.2   13.08 )-----------( 469      ( 01 Aug 2024 07:14 )             35.8     08-01    134<L>  |  97<L>  |  17  ----------------------------<  72  4.6   |  26  |  1.0    Ca    8.7      01 Aug 2024 07:14  Mg     2.1     08-01    TPro  8.2<H>  /  Alb  4.1  /  TBili  0.4  /  DBili  x   /  AST  20  /  ALT  22  /  AlkPhos  104  08-01      LIVER FUNCTIONS - ( 01 Aug 2024 07:14 )  Alb: 4.1 g/dL / Pro: 8.2 g/dL / ALK PHOS: 104 U/L / ALT: 22 U/L / AST: 20 U/L / GGT: x           Urinalysis Basic - ( 01 Aug 2024 07:14 )    Color: x / Appearance: x / SG: x / pH: x  Gluc: 72 mg/dL / Ketone: x  / Bili: x / Urobili: x   Blood: x / Protein: x / Nitrite: x   Leuk Esterase: x / RBC: x / WBC x   Sq Epi: x / Non Sq Epi: x / Bacteria: x        Culture - Acid Fast - Urine (collected 07-30-24 @ 12:15)  Source: .Urine Urine    Culture - Acid Fast - Urine (collected 07-29-24 @ 13:07)  Source: .Urine Urine  Preliminary Report (07-31-24 @ 23:09):    Culture is being performed.    Culture - Acid Fast - Urine (collected 07-28-24 @ 14:39)  Source: .Urine Urine  Preliminary Report (07-31-24 @ 23:09):    Culture is being performed.    Urinalysis with Rflx Culture (collected 07-25-24 @ 14:57)    Culture - Urine (collected 07-25-24 @ 14:57)  Source: Clean Catch None  Final Report (07-26-24 @ 18:01):    No growth            INFECTIOUS DISEASES TESTING  HIV-1/2 Combo Result: Nonreact (07-28-24 @ 12:09)      INFLAMMATORY MARKERS      RADIOLOGY & ADDITIONAL TESTS:  I have personally reviewed the last available Chest xray  CXR      CT      CARDIOLOGY TESTING  12 Lead ECG:   Ventricular Rate 97 BPM    Atrial Rate 97 BPM    P-R Interval 152 ms    QRS Duration 66 ms    Q-T Interval 344 ms    QTC Calculation(Bazett) 436 ms    P Axis 72 degrees    R Axis 91 degrees    T Axis 81 degrees    Diagnosis Line Normal sinus rhythm  Rightward axis  Borderline ECG    Confirmed by EDWIN DENNIS MD ([id number]) on 7/26/2024 7:15:46 AM (07-25-24 @ 20:58)      MEDICATIONS  cefTRIAXone   IVPB 2000 IV Intermittent every 24 hours  chlorhexidine 2% Cloths 1 Topical <User Schedule>  clotrimazole 2% Vaginal Cream 1 Vaginal daily  fluconAZOLE   Tablet 200 Oral daily  lactobacillus acidophilus 1 Oral two times a day with meals  pantoprazole    Tablet 40 Oral before breakfast  polyethylene glycol 3350 17 Oral two times a day  senna 2 Oral at bedtime      WEIGHT  Weight (kg): 51 (07-25-24 @ 13:43)  Creatinine: 1.0 mg/dL (08-01-24 @ 07:14)      ANTIBIOTICS:  cefTRIAXone   IVPB 2000 milliGRAM(s) IV Intermittent every 24 hours  fluconAZOLE   Tablet 200 milliGRAM(s) Oral daily      All available historical records have been reviewed

## 2024-08-01 NOTE — PROGRESS NOTE ADULT - ASSESSMENT
ASSESSMENT  30 yo F with PMH/PSH of appendectomy non-smoker, denies daily alcohol use, presents with right flank pain for the past 2 days, persistent, radiates to the right side of her back, associated w/ fever , chills and urinary hesitancy, denies measured fever, n/v/d, anorexia, cough, respiratory sx, change in bowel habits or other urinary sx, vaginal d/c or other associated complaints at present.     IMPRESSION  #Eosinophilia , + Strongyloides    stool o&p (-)   #R pyelonephritis in the setting of calculi with Sepsis on admission P>90 WBC 13  On admission P>90 WBC 13    UA WBC 93 ; 7/25 UCX NG     Urine AFB NG  CT A/P shows Right delayed nephrogram, marked perinephric inflammatory stranding with moderate hydronephrosis, minimal distention of renal pelvis, with right UPJ 2.2 x 1.3 x 1.3 cm calculus. Additional right renal pelvis staghorn calculus measuring 3.1 x 1.6 x 1 cm.  Findings compatible with obstructing calculus with right severe pyelonephritis. No evidence of abscess. Some imaging features raise the possibility of a chronic process, however the typical characteristics of xanthogranulomatous pyelonephritis are not appreciated at this time. Additional nonobstructing calculi within the right kidney, measuring up to 0.7 cm.  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   #Abx allergy: No Known Allergies    Creatinine: 0.8 (07-25-24 @ 14:57)      Weight (kg): 51 (07-25-24 @ 13:43)    RECOMMENDATIONS  - Ivermectin 200 mcg/kg per day x two days  - Appreciate Urology consult-  - Appreciate IR consult- no indication for PCN  - D/C Ceftriaxone--> PO bactrim 1 DS BID to complete 14 days total of antibiotics end 8/7  - Seen by GYN - concern for candidal infection, recommended - Terconazole 4% x 7 days    If any questions, please send a message or call on [employer] Teams  Please continue to update ID with any pertinent new laboratory, radiographic findings, or change in clinical status

## 2024-08-02 ENCOUNTER — TRANSCRIPTION ENCOUNTER (OUTPATIENT)
Age: 29
End: 2024-08-02

## 2024-08-02 VITALS — DIASTOLIC BLOOD PRESSURE: 63 MMHG | HEART RATE: 86 BPM | SYSTOLIC BLOOD PRESSURE: 95 MMHG | TEMPERATURE: 98 F

## 2024-08-02 PROCEDURE — 99239 HOSP IP/OBS DSCHRG MGMT >30: CPT

## 2024-08-02 RX ORDER — ACETAMINOPHEN 500 MG
2 TABLET ORAL
Qty: 15 | Refills: 0
Start: 2024-08-02

## 2024-08-02 RX ORDER — TERCONAZOLE 80 MG
1 SUPPOSITORY, VAGINAL VAGINAL
Qty: 7 | Refills: 0
Start: 2024-08-02 | End: 2024-08-08

## 2024-08-02 RX ORDER — PROBIOTIC PRODUCT - TAB 1B-250 MG
1 TAB ORAL
Qty: 14 | Refills: 0
Start: 2024-08-02 | End: 2024-08-08

## 2024-08-02 RX ORDER — SULFAMETHOXAZOLE AND TRIMETHOPRIM 400; 80 MG/1; MG/1
1 TABLET ORAL
Qty: 13 | Refills: 0
Start: 2024-08-02 | End: 2024-08-07

## 2024-08-02 RX ADMIN — CHLORHEXIDINE GLUCONATE 1 APPLICATION(S): 500 CLOTH TOPICAL at 05:46

## 2024-08-02 RX ADMIN — PANTOPRAZOLE SODIUM 40 MILLIGRAM(S): 20 TABLET, DELAYED RELEASE ORAL at 05:47

## 2024-08-02 RX ADMIN — SULFAMETHOXAZOLE AND TRIMETHOPRIM 1 TABLET(S): 400; 80 TABLET ORAL at 05:47

## 2024-08-02 RX ADMIN — Medication 10.5 MILLIGRAM(S): at 09:29

## 2024-08-02 RX ADMIN — PROBIOTIC PRODUCT - TAB 1 TABLET(S): TAB at 08:34

## 2024-08-02 RX ADMIN — Medication 17 GRAM(S): at 09:30

## 2024-08-02 NOTE — DISCHARGE NOTE NURSING/CASE MANAGEMENT/SOCIAL WORK - PATIENT PORTAL LINK FT
You can access the FollowMyHealth Patient Portal offered by [facility] by registering at the following website: [email or web address]. By joining [employer]’s FollowMyHealth portal, you will also be able to view your health information using other applications (apps) compatible with our system.

## 2024-08-02 NOTE — PROGRESS NOTE ADULT - ASSESSMENT
ASSESSMENT  28 yo F with PMH/PSH of appendectomy non-smoker, denies daily alcohol use, presents with right flank pain for the past 2 days, persistent, radiates to the right side of her back, associated w/ fever , chills and urinary hesitancy, denies measured fever, n/v/d, anorexia, cough, respiratory sx, change in bowel habits or other urinary sx, vaginal d/c or other associated complaints at present.     IMPRESSION  #Eosinophilia , + Strongyloides    stool o&p (-)   #R pyelonephritis in the setting of calculi with Sepsis on admission P>90 WBC 13  On admission P>90 WBC 13    UA WBC 93 ; 7/25 UCX NG     Urine AFB NG  CT A/P shows Right delayed nephrogram, marked perinephric inflammatory stranding with moderate hydronephrosis, minimal distention of renal pelvis, with right UPJ 2.2 x 1.3 x 1.3 cm calculus. Additional right renal pelvis staghorn calculus measuring 3.1 x 1.6 x 1 cm.  Findings compatible with obstructing calculus with right severe pyelonephritis. No evidence of abscess. Some imaging features raise the possibility of a chronic process, however the typical characteristics of xanthogranulomatous pyelonephritis are not appreciated at this time. Additional nonobstructing calculi within the right kidney, measuring up to 0.7 cm.  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   #Abx allergy: No Known Allergies    Creatinine: 0.8 (07-25-24 @ 14:57)      Weight (kg): 51 (07-25-24 @ 13:43)    RECOMMENDATIONS  - Ivermectin 200 mcg/kg per day x two days  - Appreciate Urology consult-  - Appreciate IR consult- no indication for PCN  - PO bactrim 1 DS BID to complete 14 days total of antibiotics end 8/7  - Seen by GYN - concern for candidal infection, recommended - Terconazole 4% x 7 days  - Patient can follow-up with Dr. Silas Hidalgo on Wednesdays [phone] ; [address] [facility]  or Dr. Kristel Rocha on Fridays 12:30-4:30 [phone]; [address] [place] [address]     If any questions, please send a message or call on [employer] Teams  Please continue to update ID with any pertinent new laboratory, radiographic findings, or change in clinical status

## 2024-08-02 NOTE — PROGRESS NOTE ADULT - NS ATTEST RISK PROBLEM GEN_ALL_CORE FT
- I discussed my recommendations with the primary team housestaff / Attending
- I assisted with prescription drug management (i.e discharge antibiotics)  - Patient's diagnosis/treatment is significanty limited by social determinants of health (i.e housing, homelessness)
One acute complicated injury- Strongyloides +   - I discussed my recommendations with the primary team housestaff / Attending   - I assisted with prescription drug management (i.e discharge antibiotics)  - Patient's diagnosis/treatment is significanty limited by social determinants of health (i.e housing, homelessness)

## 2024-08-02 NOTE — PROGRESS NOTE ADULT - PROVIDER SPECIALTY LIST ADULT
Hospitalist
Internal Medicine
Internal Medicine
Hospitalist
Hospitalist
Internal Medicine
Urology
Hospitalist
Infectious Disease
Internal Medicine
Infectious Disease

## 2024-08-02 NOTE — PROGRESS NOTE ADULT - SUBJECTIVE AND OBJECTIVE BOX
VON CORNEJO  29y, Female  Allergy: No Known Allergies      LOS  8d    CHIEF COMPLAINT: Urinary tract infection     (28 Jul 2024 13:49)      INTERVAL EVENTS/HPI  - No acute events overnight  - T(F): , Max: 98.3 (08-01-24 @ 16:32)  - Denies any worsening symptoms  - Tolerating medication  - WBC Count: 13.08 (08-01-24 @ 07:14)  WBC Count: 11.51 (07-31-24 @ 07:11)     - Creatinine: 1.0 (08-01-24 @ 07:14)       ROS  General: Denies rigors, nightsweats  HEENT: Denies headache, rhinorrhea, sore throat, eye pain  CV: Denies CP, palpitations  PULM: Denies wheezing, hemoptysis  GI: Denies hematemesis, hematochezia, melena  : Denies discharge, hematuria  MSK: Denies arthralgias, myalgias  SKIN: Denies rash, lesions  NEURO: Denies paresthesias, weakness  PSYCH: Denies depression, anxiety    VITALS:  T(F): 98.1, Max: 98.3 (08-01-24 @ 16:32)  HR: 86  BP: 95/63  RR: 18Vital Signs Last 24 Hrs  T(C): 36.7 (02 Aug 2024 07:30), Max: 36.8 (01 Aug 2024 16:32)  T(F): 98.1 (02 Aug 2024 07:30), Max: 98.3 (01 Aug 2024 16:32)  HR: 86 (02 Aug 2024 07:30) (82 - 108)  BP: 95/63 (02 Aug 2024 07:30) (95/63 - 114/64)  BP(mean): --  RR: 18 (01 Aug 2024 23:59) (18 - 18)  SpO2: 97% (01 Aug 2024 16:32) (97% - 97%)    Parameters below as of 01 Aug 2024 16:32  Patient On (Oxygen Delivery Method): room air        PHYSICAL EXAM:  Gen: NAD, resting in bed  HEENT: Normocephalic, atraumatic  Neck: supple, no lymphadenopathy  CV: Regular rate & regular rhythm  Lungs: decreased BS at bases, no fremitus  Abdomen: Soft, BS present  Ext: Warm, well perfused  Neuro: non focal, awake  Skin: no rash, no erythema  Lines: no phlebitis    FH: Non-contributory  Social Hx: Non-contributory    TESTS & MEASUREMENTS:                        11.2   13.08 )-----------( 469      ( 01 Aug 2024 07:14 )             35.8     08-01    134<L>  |  97<L>  |  17  ----------------------------<  72  4.6   |  26  |  1.0    Ca    8.7      01 Aug 2024 07:14  Mg     2.1     08-01    TPro  8.2<H>  /  Alb  4.1  /  TBili  0.4  /  DBili  x   /  AST  20  /  ALT  22  /  AlkPhos  104  08-01      LIVER FUNCTIONS - ( 01 Aug 2024 07:14 )  Alb: 4.1 g/dL / Pro: 8.2 g/dL / ALK PHOS: 104 U/L / ALT: 22 U/L / AST: 20 U/L / GGT: x           Urinalysis Basic - ( 01 Aug 2024 07:14 )    Color: x / Appearance: x / SG: x / pH: x  Gluc: 72 mg/dL / Ketone: x  / Bili: x / Urobili: x   Blood: x / Protein: x / Nitrite: x   Leuk Esterase: x / RBC: x / WBC x   Sq Epi: x / Non Sq Epi: x / Bacteria: x        Culture - Acid Fast - Urine (collected 07-30-24 @ 12:15)  Source: .Urine Urine    Culture - Acid Fast - Urine (collected 07-29-24 @ 13:07)  Source: .Urine Urine  Preliminary Report (07-31-24 @ 23:09):    Culture is being performed.    Culture - Acid Fast - Urine (collected 07-28-24 @ 14:39)  Source: .Urine Urine  Preliminary Report (07-31-24 @ 23:09):    Culture is being performed.    Urinalysis with Rflx Culture (collected 07-25-24 @ 14:57)    Culture - Urine (collected 07-25-24 @ 14:57)  Source: Clean Catch None  Final Report (07-26-24 @ 18:01):    No growth            INFECTIOUS DISEASES TESTING  HIV-1/2 Combo Result: Nonreact (07-28-24 @ 12:09)      INFLAMMATORY MARKERS      RADIOLOGY & ADDITIONAL TESTS:  I have personally reviewed the last available Chest xray  CXR      CT      CARDIOLOGY TESTING  12 Lead ECG:   Ventricular Rate 97 BPM    Atrial Rate 97 BPM    P-R Interval 152 ms    QRS Duration 66 ms    Q-T Interval 344 ms    QTC Calculation(Bazett) 436 ms    P Axis 72 degrees    R Axis 91 degrees    T Axis 81 degrees    Diagnosis Line Normal sinus rhythm  Rightward axis  Borderline ECG    Confirmed by EDWIN DENNIS MD ([id number]) on 7/26/2024 7:15:46 AM (07-25-24 @ 20:58)      MEDICATIONS  chlorhexidine 2% Cloths 1 Topical <User Schedule>  clotrimazole 2% Vaginal Cream 1 Vaginal daily  ivermectin 10.5 Oral daily  lactobacillus acidophilus 1 Oral two times a day with meals  pantoprazole    Tablet 40 Oral before breakfast  polyethylene glycol 3350 17 Oral two times a day  senna 2 Oral at bedtime  trimethoprim  160 mG/sulfamethoxazole 800 mG 1 Oral every 12 hours      WEIGHT  Weight (kg): 51 (07-25-24 @ 13:43)      ANTIBIOTICS:  ivermectin 10.5 milliGRAM(s) Oral daily  trimethoprim  160 mG/sulfamethoxazole 800 mG 1 Tablet(s) Oral every 12 hours      All available historical records have been reviewed

## 2024-08-05 ENCOUNTER — APPOINTMENT (OUTPATIENT)
Dept: INTERNAL MEDICINE | Facility: CLINIC | Age: 29
End: 2024-08-05

## 2024-08-05 PROBLEM — Z00.00 ENCOUNTER FOR PREVENTIVE HEALTH EXAMINATION: Status: ACTIVE | Noted: 2024-08-05

## 2024-08-05 LAB
A VAGINAE DNA VAG QL NAA+PROBE: SIGNIFICANT CHANGE UP
BVAB2 DNA VAG QL NAA+PROBE: SIGNIFICANT CHANGE UP
C ALBICANS DNA VAG QL NAA+PROBE: POSITIVE
C GLABRATA DNA VAG QL NAA+PROBE: NEGATIVE — SIGNIFICANT CHANGE UP
C KRUSEI DNA VAG QL NAA+PROBE: NEGATIVE — SIGNIFICANT CHANGE UP
C LUSITANIAE DNA VAG QL NAA+PROBE: NEGATIVE — SIGNIFICANT CHANGE UP
C TRACH RRNA SPEC QL NAA+PROBE: NEGATIVE — SIGNIFICANT CHANGE UP
CANDIDA DNA VAG QL NAA+PROBE: NEGATIVE — SIGNIFICANT CHANGE UP
MEGA1 DNA VAG QL NAA+PROBE: SIGNIFICANT CHANGE UP
N GONORRHOEA RRNA SPEC QL NAA+PROBE: NEGATIVE — SIGNIFICANT CHANGE UP
T VAGINALIS RRNA SPEC QL NAA+PROBE: NEGATIVE — SIGNIFICANT CHANGE UP

## 2024-08-05 NOTE — CDI QUERY NOTE - NSCDIOTHERTXTBX_GEN_ALL_CORE_HH
There is inconsistent documentation in the medical record regarding the patient’s condition.      In order to ensure accurate coding and accuracy of the clinical record, the documentation in this patient’s medical record requires additional clarification.      The diagnosis of sepsis has been documented in the medical record by Infectious Disease Attending:    Please review the documentation in the medical record and clarify the appropriate diagnosis (along with any supporting documentation) in your progress note and/or discharge summary.    • I concur with the documentation of sepsis by Infectious Disease provider  • I do not concur with the documentation of sepsis by Infectious Disease provider  • Other (please specify):    Supporting documentation and/or clinical evidence:   7/25 H&P Adult: … pyelonephritis … Continue IV abx - Rocephin - ID cs    7/26 Consult Note Adult-Infectious Disease Attending: … R pyelonephritis in the setting of calculi with Sepsis on admission P>90 WBC 13    7/27, 7/30, 8/1-8/2 Progress Note Adult-Infectious Disease Attending: … R pyelonephritis in the setting of calculi with Sepsis on admission P>90 WBC 13    7/26-8/1 Progress Note Adult-Hospitalist Attending: … right pyelonephritis    8/2 Discharge Note Provider: … PRINCIPAL DISCHARGE DIAGNOSIS: UTI (urinary tract infection)    Lab findings: 7/25 WBC-13.43    Nursing VS flowsheet: 7/25 HR 95, 93    Per MAR: Rocephin IVPB. Indication: Genitourinary infection. Administered 7/25-8/1                 LR Bolus 1000 ml IV. Administered 7/25        Thank you,  Breanne Cary RN [facility]  [phone]

## 2024-08-07 LAB — SCHISTOSOMA IGG SER-ACNC: <1 — SIGNIFICANT CHANGE UP

## 2025-01-12 NOTE — DISCHARGE NOTE PROVIDER - DISCHARGE DIET
1541 called [facility] for BLS unit to transfer returning patient back to [facility]. Spoke with Mohsen at dispatch. ETA for  scheduled for [id number]   Regular Diet - No restrictions